# Patient Record
Sex: FEMALE | Race: WHITE | Employment: UNEMPLOYED | URBAN - METROPOLITAN AREA
[De-identification: names, ages, dates, MRNs, and addresses within clinical notes are randomized per-mention and may not be internally consistent; named-entity substitution may affect disease eponyms.]

---

## 2017-10-26 ENCOUNTER — OFFICE VISIT (OUTPATIENT)
Dept: OBGYN CLINIC | Age: 27
End: 2017-10-26
Payer: MEDICARE

## 2017-10-26 ENCOUNTER — HOSPITAL ENCOUNTER (OUTPATIENT)
Age: 27
Setting detail: SPECIMEN
Discharge: HOME | End: 2017-10-26
Payer: MEDICARE

## 2017-10-26 VITALS
WEIGHT: 158 LBS | HEART RATE: 72 BPM | RESPIRATION RATE: 18 BRPM | HEIGHT: 61 IN | SYSTOLIC BLOOD PRESSURE: 114 MMHG | DIASTOLIC BLOOD PRESSURE: 70 MMHG | BODY MASS INDEX: 29.83 KG/M2

## 2017-10-26 DIAGNOSIS — Z01.419 WELL FEMALE EXAM WITH ROUTINE GYNECOLOGICAL EXAM: Primary | ICD-10-CM

## 2017-10-26 PROCEDURE — 99395 PREV VISIT EST AGE 18-39: CPT | Performed by: ADVANCED PRACTICE MIDWIFE

## 2017-10-26 PROCEDURE — G0145 SCR C/V CYTO,THINLAYER,RESCR: HCPCS

## 2017-10-26 ASSESSMENT — PATIENT HEALTH QUESTIONNAIRE - PHQ9
1. LITTLE INTEREST OR PLEASURE IN DOING THINGS: 0
SUM OF ALL RESPONSES TO PHQ QUESTIONS 1-9: 0
SUM OF ALL RESPONSES TO PHQ9 QUESTIONS 1 & 2: 0
2. FEELING DOWN, DEPRESSED OR HOPELESS: 0

## 2017-10-26 NOTE — PATIENT INSTRUCTIONS
Breast Self-Exam: Care Instructions  Your Care Instructions  A breast self-exam is when you check your breasts for lumps or changes. This regular exam helps you learn how your breasts normally look and feel. Most breast problems or changes are not because of cancer. Breast self-exam is not a substitute for a mammogram. Having regular breast exams by your doctor and regular mammograms improve your chances of finding any problems with your breasts. Some women set a time each month to do a step-by-step breast self-exam. Other women like a less formal system. They might look at their breasts as they brush their teeth, or feel their breasts once in a while in the shower. If you notice a change in your breast, tell your doctor. Follow-up care is a key part of your treatment and safety. Be sure to make and go to all appointments, and call your doctor if you are having problems. Its also a good idea to know your test results and keep a list of the medicines you take. How do you do a breast self-exam?  · The best time to examine your breasts is usually one week after your menstrual period begins. Your breasts should not be tender then. If you do not have periods, you might do your exam on a day of the month that is easy to remember. · To examine your breasts:  ¨ Remove all your clothes above the waist and lie down. When you are lying down, your breast tissue spreads evenly over your chest wall, which makes it easier to feel all your breast tissue. ¨ Use the padsnot the fingertipsof the 3 middle fingers of your left hand to check your right breast. Move your fingers slowly in small coin-sized circles that overlap. ¨ Use three levels of pressure to feel of all your breast tissue. Use light pressure to feel the tissue close to the skin surface. Use medium pressure to feel a little deeper. Use firm pressure to feel your tissue close to your breastbone and ribs.  Use each pressure level to feel your breast tissue

## 2017-10-26 NOTE — PROGRESS NOTES
History and Physical  [address], [address] [facility] [address] [address] [facility] [phone]   Fax [phone]  [address] [place] [address]  10/26/2017              32 y.o. Chief Complaint   Patient presents with    Annual Exam       Patient's last menstrual period was 10/13/2017. Primary Care Physician: Jean Perez MD    The patient was seen and examined. She has no chief complaint today and is here for her annual exam.  Her bowels are regular. There are no voiding complaints. She denies any bloating. She denies vaginal discharge and was counseled on STD's and the need for barrier contraception.      HPI : Mojgan [facility] is a 32 y.o. female [id number]    Annual exam, no complaints trying to make healthy life style changes, less process foods more whole foods  ________________________________________________________________________  Obstetric History       T2      L2     SAB0   TAB0   Ectopic0   Molar0   Multiple0   Live Births2       # Outcome Date GA Lbr Андрей/2nd Weight Sex Delivery Anes PTL Lv   3 Term 06/11/15 40w0d  9 lb 0.5 oz (4.097 kg) F  Spinal  ITZ      Name: Loral Passy:  8                Apgar5: 9   2 Term 12 39w2d  8 lb 11 oz (3.941 kg) M Vag-Spont  N ITZ      Apgar1:  8                Apgar5: 9   1 SAB                 Past Medical History:   Diagnosis Date    Anemia complicating pregnancy 9237    Fetal demise 13    @ 11 wks     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 Review Of Systems (11 point):  Constitutional: No fever, chills or malaise; No weight change or fatigue  Head and Eyes: No vision, Headache, Dizziness or trauma in last 12 months  ENT ROS: No  Tinnitis, sinus or taste problems. + hearing loss- wears hearing aid  Hematological and Lymphatic ROS:No Lymphoma, Von Willebrand's, Hemophillia or Bleeding History  Psych ROS: No Depression, Homicidal thoughts,suicidal thoughts, or anxiety  Breast ROS: No prior breast abnormalities or lumps  Respiratory ROS: No SOB, Pneumoniae,Cough, or Pulmonary Embolism History  Cardiovascular ROS: No Chest Pain with Exertion, Palpitations, Syncope, Edema, Arrhythmia  Gastrointestinal ROS: No Indigestion, Heartburn, Nausea, vomiting, Diarrhea, Constipation,or Bowel Changes; No Bloody Stools or melena  Genito-Urinary ROS: No Dysuria, Hematuria or Nocturia. No Urinary Incontinence or Vaginal Discharge  Musculoskeletal ROS: No Arthralgia, Arthritis,Gout,Osteoporosis or Rheumatism  Neurological ROS: No CVA, Migraines, Epilepsy, Seizure Hx, or Limb Weakness  Dermatological ROS: No Rash, Itching, Hives, Mole Changes or Cancer                                                                                                                                                                                                                                  PHYSICAL Exam:     Constitutional:  Vitals:    10/26/17 1221   BP: 114/70   Site: Left Arm   Position: Sitting   Cuff Size: Medium Adult   Pulse: 72   Resp: 18   Weight: 158 lb (71.7 kg)   Height: 5' 1\" (1.549 m)       General Appearance: This  is a well Developed, well Nourished, well groomed female. Her BMI was reviewed. Nutritional decision making was discussed. Skin:  There was a Normal Inspection of the skin without rashes or lesions. There were no rashes. (Papular, Maculopapular, Hives, Pustular, Macular)     There were no lesions (Ulcers, Erythema, Abn.  Appearing Nevi) (around 10/26/2018) for Annual.   Pap smear obtained. Repeat Annual every 1 year  Cervical Cytology Evaluation begins at 24years old. If Negative Cytology, Follow-up screening per current guidelines. Mammograms every 1 year. If 37 yo and last mammogram was negative. Calcium and Vitamin D dosing reviewed. Colonoscopy screening reviewed as well as onset for bone density testing. Birth control and barrier recommendations discussed. STD counseling and prevention reviewed. Gardisil counseling completed for all patients 7-33 yo. Routine health maintenance per patients PCP. Orders Placed This Encounter   Procedures    PAP SMEAR     Patient History:    Patient's last menstrual period was 10/13/2017. OBGYN Status: Having periods  Past Surgical History:  06/11/15:  SECTION  2013: DILATION AND CURETTAGE  2016: TUBAL LIGATION  No date: WISDOM TOOTH EXTRACTION      Smoking status: Never Smoker                                                              Smokeless tobacco: Never Used                           Standing Status:   Future     Standing Expiration Date:   10/26/2018     Order Specific Question:   Collection Type     Answer: Thin Prep     Order Specific Question:   Prior Abnormal Pap Test     Answer:   No     Order Specific Question:   Screening or Diagnostic     Answer:   Screening     Order Specific Question:   HPV Requested?      Answer:   Yes -  If ASCUS Reflex HPV     Order Specific Question:   High Risk Patient     Answer:   N/A

## 2017-11-08 LAB — CYTOLOGY REPORT: NORMAL

## 2018-09-05 ENCOUNTER — OFFICE VISIT (OUTPATIENT)
Dept: FAMILY MEDICINE CLINIC | Age: 28
End: 2018-09-05
Payer: MEDICARE

## 2018-09-05 VITALS
BODY MASS INDEX: 29.64 KG/M2 | HEIGHT: 61 IN | WEIGHT: 157 LBS | OXYGEN SATURATION: 99 % | TEMPERATURE: 98.2 F | HEART RATE: 89 BPM | SYSTOLIC BLOOD PRESSURE: 144 MMHG | DIASTOLIC BLOOD PRESSURE: 80 MMHG

## 2018-09-05 DIAGNOSIS — H60.312 ACUTE DIFFUSE OTITIS EXTERNA OF LEFT EAR: Primary | ICD-10-CM

## 2018-09-05 DIAGNOSIS — H60.12 CELLULITIS OF EAR CANAL, LEFT: ICD-10-CM

## 2018-09-05 PROCEDURE — 4130F TOPICAL PREP RX AOE: CPT | Performed by: FAMILY MEDICINE

## 2018-09-05 PROCEDURE — G8427 DOCREV CUR MEDS BY ELIG CLIN: HCPCS | Performed by: FAMILY MEDICINE

## 2018-09-05 PROCEDURE — 1036F TOBACCO NON-USER: CPT | Performed by: FAMILY MEDICINE

## 2018-09-05 PROCEDURE — S0077 INJECTION, CLINDAMYCIN PHOSP: HCPCS | Performed by: FAMILY MEDICINE

## 2018-09-05 PROCEDURE — 99214 OFFICE O/P EST MOD 30 MIN: CPT | Performed by: FAMILY MEDICINE

## 2018-09-05 PROCEDURE — G8419 CALC BMI OUT NRM PARAM NOF/U: HCPCS | Performed by: FAMILY MEDICINE

## 2018-09-05 RX ORDER — CIPROFLOXACIN AND DEXAMETHASONE 3; 1 MG/ML; MG/ML
4 SUSPENSION/ DROPS AURICULAR (OTIC) 2 TIMES DAILY
Qty: 7.5 ML | Refills: 0 | Status: SHIPPED | OUTPATIENT
Start: 2018-09-05 | End: 2018-09-12

## 2018-09-05 RX ORDER — CLINDAMYCIN HYDROCHLORIDE 150 MG/1
150 CAPSULE ORAL 3 TIMES DAILY
Qty: 21 CAPSULE | Refills: 0 | Status: SHIPPED | OUTPATIENT
Start: 2018-09-05 | End: 2018-09-12

## 2018-09-05 RX ORDER — CLINDAMYCIN PHOSPHATE 150 MG/ML
600 INJECTION, SOLUTION INTRAVENOUS ONCE
Status: COMPLETED | OUTPATIENT
Start: 2018-09-05 | End: 2018-09-05

## 2018-09-05 RX ADMIN — CLINDAMYCIN PHOSPHATE 600 MG: 150 INJECTION, SOLUTION INTRAVENOUS at 10:52

## 2018-09-05 ASSESSMENT — ENCOUNTER SYMPTOMS
RESPIRATORY NEGATIVE: 1
ALLERGIC/IMMUNOLOGIC NEGATIVE: 1
EYES NEGATIVE: 1
GASTROINTESTINAL NEGATIVE: 1

## 2018-09-12 ENCOUNTER — OFFICE VISIT (OUTPATIENT)
Dept: INTERNAL MEDICINE CLINIC | Age: 28
End: 2018-09-12
Payer: MEDICARE

## 2018-09-12 VITALS
HEIGHT: 61 IN | DIASTOLIC BLOOD PRESSURE: 86 MMHG | BODY MASS INDEX: 29.83 KG/M2 | SYSTOLIC BLOOD PRESSURE: 145 MMHG | WEIGHT: 158 LBS

## 2018-09-12 DIAGNOSIS — D50.0 IRON DEFICIENCY ANEMIA DUE TO CHRONIC BLOOD LOSS: ICD-10-CM

## 2018-09-12 DIAGNOSIS — H65.00 ACUTE SEROUS OTITIS MEDIA, RECURRENCE NOT SPECIFIED, UNSPECIFIED LATERALITY: Primary | ICD-10-CM

## 2018-09-12 PROCEDURE — 1036F TOBACCO NON-USER: CPT | Performed by: INTERNAL MEDICINE

## 2018-09-12 PROCEDURE — G8427 DOCREV CUR MEDS BY ELIG CLIN: HCPCS | Performed by: INTERNAL MEDICINE

## 2018-09-12 PROCEDURE — G8419 CALC BMI OUT NRM PARAM NOF/U: HCPCS | Performed by: INTERNAL MEDICINE

## 2018-09-12 PROCEDURE — 99213 OFFICE O/P EST LOW 20 MIN: CPT | Performed by: INTERNAL MEDICINE

## 2018-09-12 ASSESSMENT — ENCOUNTER SYMPTOMS
EYE PAIN: 0
TROUBLE SWALLOWING: 0
BLOOD IN STOOL: 0
ABDOMINAL DISTENTION: 0
COLOR CHANGE: 0
WHEEZING: 0
SHORTNESS OF BREATH: 0
EYE DISCHARGE: 0
DIARRHEA: 0
COUGH: 0

## 2018-09-12 NOTE — PROGRESS NOTES
Subjective:      Patient ID: Ryland Andrade is a 29 y.o. female. Visit Information    Have you changed or started any medications since your last visit including any over-the-counter medicines, vitamins, or herbal medicines? no   Are you having any side effects from any of your medications? -  no  Have you stopped taking any of your medications? Is so, why? -  no    Have you seen any other physician or provider since your last visit? Yes   Have you had any other diagnostic tests since your last visit? No  Have you been seen in the emergency room and/or had an admission to a hospital since we last saw you? No  Have you had your routine dental cleaning in the past 6 months? no    Have you activated your [employer] account? If not, what are your barriers? No: declined      Patient Care Team:  Charity Singleton MD as PCP - General (Family Medicine)  Charity Singleton MD as PCP - S Attributed Provider  Deangelo Nguyen DO as Consulting Physician (Obstetrics & Gynecology)  Linda Ceballos MD as Obstetrician (Perinatology)    Medical History Review  Past Medical, Family, and Social History reviewed and does contribute to the patient presenting condition  Chief Complaint   Patient presents with   Cam Tyshawn Establish Care     previous pcp is Dr Jaime Goodwin     went to walk in clinic and given - finishing up the meds today     Hypertension     Her bp was elevated at the walk in clinic and here today     Health Maintenance   Topic Date Due    DTaP/Tdap/Td vaccine (1 - Tdap) 03/10/2009    Flu vaccine (1) 09/01/2018    Cervical cancer screen  10/26/2018    HIV screen  Completed     Ear pian for few days  No fever  No chils  HTN  Onset more than 2 years ago  mally mild to mod  Controlled with current po meds  Not associated with headaches or blurry vision  No chest pain          Review of Systems   Constitutional: Negative for appetite change, diaphoresis and fatigue.    HENT: Positive for ear discharge and Future     Standing Expiration Date:   12/11/2018    CBC Auto Differential     Standing Status:   Future     Standing Expiration Date:   12/11/2018     No orders of the defined types were placed in this encounter.      bp is high here   No hx of ht  Will call back to check  Rule out white coat htn

## 2018-09-14 ENCOUNTER — HOSPITAL ENCOUNTER (OUTPATIENT)
Age: 28
Discharge: HOME | End: 2018-09-14
Payer: MEDICARE

## 2018-09-14 DIAGNOSIS — D50.0 IRON DEFICIENCY ANEMIA DUE TO CHRONIC BLOOD LOSS: ICD-10-CM

## 2018-09-14 LAB
ABSOLUTE EOS #: 0.1 K/UL (ref 0–0.4)
ABSOLUTE IMMATURE GRANULOCYTE: NORMAL K/UL (ref 0–0.3)
ABSOLUTE LYMPH #: 2.2 K/UL (ref 1–4.8)
ABSOLUTE MONO #: 0.4 K/UL (ref 0.1–1.3)
ANION GAP SERPL CALCULATED.3IONS-SCNC: 13 MMOL/L (ref 9–17)
BASOPHILS # BLD: 1 % (ref 0–2)
BASOPHILS ABSOLUTE: 0 K/UL (ref 0–0.2)
BUN BLDV-MCNC: 15 MG/DL (ref 6–20)
BUN/CREAT BLD: NORMAL (ref 9–20)
CALCIUM SERPL-MCNC: 9.2 MG/DL (ref 8.6–10.4)
CHLORIDE BLD-SCNC: 101 MMOL/L (ref 98–107)
CHOLESTEROL/HDL RATIO: 3
CHOLESTEROL: 182 MG/DL
CO2: 25 MMOL/L (ref 20–31)
CREAT SERPL-MCNC: 0.67 MG/DL (ref 0.5–0.9)
DIFFERENTIAL TYPE: NORMAL
EOSINOPHILS RELATIVE PERCENT: 2 % (ref 0–4)
GFR AFRICAN AMERICAN: >60 ML/MIN
GFR NON-AFRICAN AMERICAN: >60 ML/MIN
GFR SERPL CREATININE-BSD FRML MDRD: NORMAL ML/MIN/{1.73_M2}
GFR SERPL CREATININE-BSD FRML MDRD: NORMAL ML/MIN/{1.73_M2}
GLUCOSE BLD-MCNC: 95 MG/DL (ref 70–99)
HCT VFR BLD CALC: 41.3 % (ref 36–46)
HDLC SERPL-MCNC: 61 MG/DL
HEMOGLOBIN: 13.7 G/DL (ref 12–16)
IMMATURE GRANULOCYTES: NORMAL %
LDL CHOLESTEROL: 101 MG/DL (ref 0–130)
LYMPHOCYTES # BLD: 39 % (ref 24–44)
MCH RBC QN AUTO: 27.9 PG (ref 26–34)
MCHC RBC AUTO-ENTMCNC: 33.1 G/DL (ref 31–37)
MCV RBC AUTO: 84.5 FL (ref 80–100)
MONOCYTES # BLD: 6 % (ref 1–7)
NRBC AUTOMATED: NORMAL PER 100 WBC
PDW BLD-RTO: 13.4 % (ref 11.5–14.9)
PLATELET # BLD: 232 K/UL (ref 150–450)
PLATELET ESTIMATE: NORMAL
PMV BLD AUTO: 9.2 FL (ref 6–12)
POTASSIUM SERPL-SCNC: 3.9 MMOL/L (ref 3.7–5.3)
RBC # BLD: 4.89 M/UL (ref 4–5.2)
RBC # BLD: NORMAL 10*6/UL
SEG NEUTROPHILS: 52 % (ref 36–66)
SEGMENTED NEUTROPHILS ABSOLUTE COUNT: 3 K/UL (ref 1.3–9.1)
SODIUM BLD-SCNC: 139 MMOL/L (ref 135–144)
TRIGL SERPL-MCNC: 98 MG/DL
VLDLC SERPL CALC-MCNC: NORMAL MG/DL (ref 1–30)
WBC # BLD: 5.7 K/UL (ref 3.5–11)
WBC # BLD: NORMAL 10*3/UL

## 2018-09-14 PROCEDURE — 85025 COMPLETE CBC W/AUTO DIFF WBC: CPT

## 2018-09-14 PROCEDURE — 80048 BASIC METABOLIC PNL TOTAL CA: CPT

## 2018-09-14 PROCEDURE — 80061 LIPID PANEL: CPT

## 2018-09-14 PROCEDURE — 36415 COLL VENOUS BLD VENIPUNCTURE: CPT

## 2019-01-07 ENCOUNTER — HOSPITAL ENCOUNTER (OUTPATIENT)
Age: 29
Setting detail: SPECIMEN
Discharge: HOME | End: 2019-01-07
Payer: MEDICARE

## 2019-01-07 ENCOUNTER — OFFICE VISIT (OUTPATIENT)
Dept: OBGYN CLINIC | Age: 29
End: 2019-01-07
Payer: MEDICARE

## 2019-01-07 VITALS
BODY MASS INDEX: 31.91 KG/M2 | HEART RATE: 78 BPM | HEIGHT: 61 IN | RESPIRATION RATE: 16 BRPM | SYSTOLIC BLOOD PRESSURE: 116 MMHG | WEIGHT: 169 LBS | DIASTOLIC BLOOD PRESSURE: 70 MMHG

## 2019-01-07 DIAGNOSIS — Z11.51 SPECIAL SCREENING EXAMINATION FOR HUMAN PAPILLOMAVIRUS (HPV): ICD-10-CM

## 2019-01-07 DIAGNOSIS — Z01.419 WELL FEMALE EXAM WITH ROUTINE GYNECOLOGICAL EXAM: ICD-10-CM

## 2019-01-07 DIAGNOSIS — Z01.419 WELL FEMALE EXAM WITH ROUTINE GYNECOLOGICAL EXAM: Primary | ICD-10-CM

## 2019-01-07 DIAGNOSIS — N92.1 MENORRHAGIA WITH IRREGULAR CYCLE: ICD-10-CM

## 2019-01-07 PROCEDURE — G8484 FLU IMMUNIZE NO ADMIN: HCPCS | Performed by: NURSE PRACTITIONER

## 2019-01-07 PROCEDURE — G0145 SCR C/V CYTO,THINLAYER,RESCR: HCPCS

## 2019-01-07 PROCEDURE — 99395 PREV VISIT EST AGE 18-39: CPT | Performed by: NURSE PRACTITIONER

## 2019-01-07 ASSESSMENT — PATIENT HEALTH QUESTIONNAIRE - PHQ9
SUM OF ALL RESPONSES TO PHQ QUESTIONS 1-9: 0
1. LITTLE INTEREST OR PLEASURE IN DOING THINGS: 0
SUM OF ALL RESPONSES TO PHQ QUESTIONS 1-9: 0
SUM OF ALL RESPONSES TO PHQ9 QUESTIONS 1 & 2: 0
2. FEELING DOWN, DEPRESSED OR HOPELESS: 0

## 2019-01-08 LAB — COMMENT: NORMAL

## 2019-01-16 ENCOUNTER — HOSPITAL ENCOUNTER (OUTPATIENT)
Age: 29
Discharge: HOME | End: 2019-01-16
Payer: MEDICARE

## 2019-01-16 ENCOUNTER — OFFICE VISIT (OUTPATIENT)
Dept: OBGYN CLINIC | Age: 29
End: 2019-01-16
Payer: MEDICARE

## 2019-01-16 DIAGNOSIS — N92.1 MENORRHAGIA WITH IRREGULAR CYCLE: Primary | ICD-10-CM

## 2019-01-16 DIAGNOSIS — N92.1 MENORRHAGIA WITH IRREGULAR CYCLE: ICD-10-CM

## 2019-01-16 LAB
ABSOLUTE EOS #: 0.1 K/UL (ref 0–0.4)
ABSOLUTE IMMATURE GRANULOCYTE: NORMAL K/UL (ref 0–0.3)
ABSOLUTE LYMPH #: 2.1 K/UL (ref 1–4.8)
ABSOLUTE MONO #: 0.4 K/UL (ref 0.1–1.3)
ALT SERPL-CCNC: 13 U/L (ref 5–33)
AST SERPL-CCNC: 19 U/L
BASOPHILS # BLD: 1 % (ref 0–2)
BASOPHILS ABSOLUTE: 0 K/UL (ref 0–0.2)
BUN BLDV-MCNC: 11 MG/DL (ref 6–20)
CREAT SERPL-MCNC: 0.63 MG/DL (ref 0.5–0.9)
DIFFERENTIAL TYPE: NORMAL
EOSINOPHILS RELATIVE PERCENT: 2 % (ref 0–4)
ESTIMATED AVERAGE GLUCOSE: 97 MG/DL
FOLLICLE STIMULATING HORMONE: 3.5 U/L (ref 1.7–21.5)
GFR AFRICAN AMERICAN: >60 ML/MIN
GFR NON-AFRICAN AMERICAN: >60 ML/MIN
GFR SERPL CREATININE-BSD FRML MDRD: NORMAL ML/MIN/{1.73_M2}
GFR SERPL CREATININE-BSD FRML MDRD: NORMAL ML/MIN/{1.73_M2}
GLUCOSE FASTING: 88 MG/DL (ref 70–99)
HBA1C MFR BLD: 5 % (ref 4–6)
HCG QUANTITATIVE: <1 IU/L
HCT VFR BLD CALC: 40.3 % (ref 36–46)
HEMOGLOBIN: 13.5 G/DL (ref 12–16)
IMMATURE GRANULOCYTES: NORMAL %
INR BLD: 1.1
INSULIN COMMENT: NORMAL
INSULIN REFERENCE RANGE:: NORMAL
INSULIN: 4.6 MU/L
LH: 4.1 U/L (ref 1–95.6)
LYMPHOCYTES # BLD: 34 % (ref 24–44)
MCH RBC QN AUTO: 28.3 PG (ref 26–34)
MCHC RBC AUTO-ENTMCNC: 33.5 G/DL (ref 31–37)
MCV RBC AUTO: 84.7 FL (ref 80–100)
MONOCYTES # BLD: 6 % (ref 1–7)
NRBC AUTOMATED: NORMAL PER 100 WBC
PARTIAL THROMBOPLASTIN TIME: 32.9 SEC (ref 23–31)
PDW BLD-RTO: 13.4 % (ref 11.5–14.9)
PLATELET # BLD: 232 K/UL (ref 150–450)
PLATELET ESTIMATE: NORMAL
PMV BLD AUTO: 9.1 FL (ref 6–12)
PROTHROMBIN TIME: 11.2 SEC (ref 9.7–12)
RBC # BLD: 4.75 M/UL (ref 4–5.2)
RBC # BLD: NORMAL 10*6/UL
SEG NEUTROPHILS: 57 % (ref 36–66)
SEGMENTED NEUTROPHILS ABSOLUTE COUNT: 3.6 K/UL (ref 1.3–9.1)
TSH SERPL DL<=0.05 MIU/L-ACNC: 0.61 MIU/L (ref 0.3–5)
WBC # BLD: 6.2 K/UL (ref 3.5–11)
WBC # BLD: NORMAL 10*3/UL

## 2019-01-16 PROCEDURE — 76856 US EXAM PELVIC COMPLETE: CPT | Performed by: OBSTETRICS & GYNECOLOGY

## 2019-01-16 PROCEDURE — 85025 COMPLETE CBC W/AUTO DIFF WBC: CPT

## 2019-01-16 PROCEDURE — 84520 ASSAY OF UREA NITROGEN: CPT

## 2019-01-16 PROCEDURE — 36415 COLL VENOUS BLD VENIPUNCTURE: CPT

## 2019-01-16 PROCEDURE — 85730 THROMBOPLASTIN TIME PARTIAL: CPT

## 2019-01-16 PROCEDURE — 83525 ASSAY OF INSULIN: CPT

## 2019-01-16 PROCEDURE — 84450 TRANSFERASE (AST) (SGOT): CPT

## 2019-01-16 PROCEDURE — 84460 ALANINE AMINO (ALT) (SGPT): CPT

## 2019-01-16 PROCEDURE — 82947 ASSAY GLUCOSE BLOOD QUANT: CPT

## 2019-01-16 PROCEDURE — 84443 ASSAY THYROID STIM HORMONE: CPT

## 2019-01-16 PROCEDURE — 83036 HEMOGLOBIN GLYCOSYLATED A1C: CPT

## 2019-01-16 PROCEDURE — 85610 PROTHROMBIN TIME: CPT

## 2019-01-16 PROCEDURE — 83001 ASSAY OF GONADOTROPIN (FSH): CPT

## 2019-01-16 PROCEDURE — 82565 ASSAY OF CREATININE: CPT

## 2019-01-16 PROCEDURE — 84702 CHORIONIC GONADOTROPIN TEST: CPT

## 2019-01-16 PROCEDURE — 83002 ASSAY OF GONADOTROPIN (LH): CPT

## 2019-01-16 PROCEDURE — 76830 TRANSVAGINAL US NON-OB: CPT | Performed by: OBSTETRICS & GYNECOLOGY

## 2019-01-17 ENCOUNTER — TELEPHONE (OUTPATIENT)
Dept: OBGYN CLINIC | Age: 29
End: 2019-01-17

## 2019-01-17 DIAGNOSIS — R79.1 ELEVATED PARTIAL THROMBOPLASTIN TIME (PTT): Primary | ICD-10-CM

## 2019-01-17 LAB — CYTOLOGY REPORT: NORMAL

## 2019-01-22 ENCOUNTER — TELEPHONE (OUTPATIENT)
Dept: OBGYN CLINIC | Age: 29
End: 2019-01-22

## 2019-02-04 ENCOUNTER — HOSPITAL ENCOUNTER (OUTPATIENT)
Age: 29
Discharge: HOME | End: 2019-02-04
Payer: MEDICARE

## 2019-02-04 DIAGNOSIS — R79.1 ELEVATED PARTIAL THROMBOPLASTIN TIME (PTT): ICD-10-CM

## 2019-02-04 LAB
COLLAGEN ADENOSINE-5'-DIPHOSPHATE (ADP) TIME: 119 SEC (ref 67–112)
COLLAGEN EPINEPHRINE TIME: 105 SEC (ref 85–172)
PLATELET FUNCTION INTERP: ABNORMAL

## 2019-02-04 PROCEDURE — 85240 CLOT FACTOR VIII AHG 1 STAGE: CPT

## 2019-02-04 PROCEDURE — 85576 BLOOD PLATELET AGGREGATION: CPT

## 2019-02-04 PROCEDURE — 36415 COLL VENOUS BLD VENIPUNCTURE: CPT

## 2019-02-05 LAB — FACTOR VIII ACTIVITY: 50 % (ref 50–150)

## 2019-02-06 ENCOUNTER — TELEPHONE (OUTPATIENT)
Dept: OBGYN CLINIC | Age: 29
End: 2019-02-06

## 2019-02-06 DIAGNOSIS — D69.1 ABNORMAL PLATELET FUNCTION TEST (HCC): Primary | ICD-10-CM

## 2019-03-01 ENCOUNTER — INITIAL CONSULT (OUTPATIENT)
Dept: ONCOLOGY | Age: 29
End: 2019-03-01
Payer: MEDICARE

## 2019-03-01 VITALS
TEMPERATURE: 98.3 F | HEIGHT: 61 IN | BODY MASS INDEX: 30.83 KG/M2 | WEIGHT: 163.31 LBS | HEART RATE: 82 BPM | SYSTOLIC BLOOD PRESSURE: 127 MMHG | DIASTOLIC BLOOD PRESSURE: 77 MMHG

## 2019-03-01 DIAGNOSIS — D69.1 PLATELET DYSFUNCTION (HCC): Primary | ICD-10-CM

## 2019-03-01 DIAGNOSIS — D69.9 BLEEDING TENDENCY (HCC): ICD-10-CM

## 2019-03-01 PROCEDURE — G8417 CALC BMI ABV UP PARAM F/U: HCPCS | Performed by: INTERNAL MEDICINE

## 2019-03-01 PROCEDURE — 99201 HC NEW PT, E/M LEVEL 1: CPT | Performed by: INTERNAL MEDICINE

## 2019-03-01 PROCEDURE — G8427 DOCREV CUR MEDS BY ELIG CLIN: HCPCS | Performed by: INTERNAL MEDICINE

## 2019-03-01 PROCEDURE — G8484 FLU IMMUNIZE NO ADMIN: HCPCS | Performed by: INTERNAL MEDICINE

## 2019-03-01 PROCEDURE — 99244 OFF/OP CNSLTJ NEW/EST MOD 40: CPT | Performed by: INTERNAL MEDICINE

## 2019-03-06 ENCOUNTER — HOSPITAL ENCOUNTER (OUTPATIENT)
Age: 29
Discharge: HOME | End: 2019-03-06
Payer: MEDICARE

## 2019-03-06 DIAGNOSIS — D69.1 PLATELET DYSFUNCTION (HCC): ICD-10-CM

## 2019-03-06 DIAGNOSIS — D69.9 BLEEDING TENDENCY (HCC): ICD-10-CM

## 2019-03-06 LAB
COLLAGEN ADENOSINE-5'-DIPHOSPHATE (ADP) TIME: 100 SEC (ref 67–112)
COLLAGEN EPINEPHRINE TIME: 138 SEC (ref 85–172)
INR BLD: 0.9
PARTIAL THROMBOPLASTIN TIME: 30.6 SEC (ref 24–36)
PLATELET FUNCTION INTERP: NORMAL
PROTHROMBIN TIME: 12.3 SEC (ref 11.8–14.6)

## 2019-03-06 PROCEDURE — 85245 CLOT FACTOR VIII VW RISTOCTN: CPT

## 2019-03-06 PROCEDURE — 85610 PROTHROMBIN TIME: CPT

## 2019-03-06 PROCEDURE — 85576 BLOOD PLATELET AGGREGATION: CPT

## 2019-03-06 PROCEDURE — 85300 ANTITHROMBIN III ACTIVITY: CPT

## 2019-03-06 PROCEDURE — 85730 THROMBOPLASTIN TIME PARTIAL: CPT

## 2019-03-06 PROCEDURE — 85246 CLOT FACTOR VIII VW ANTIGEN: CPT

## 2019-03-06 PROCEDURE — 85240 CLOT FACTOR VIII AHG 1 STAGE: CPT

## 2019-03-06 PROCEDURE — 36415 COLL VENOUS BLD VENIPUNCTURE: CPT

## 2019-03-08 LAB
FACTOR VIII ACTIVITY: 81 % (ref 50–150)
INR BLD: 0.9
PARTIAL THROMBOPLASTIN TIME: 25.5 SEC (ref 20.5–30.5)
PROTHROMBIN TIME: 9.8 SEC (ref 9–12)
RISTOCETIN CO-FACTOR: 74 % (ref 51–215)

## 2019-03-12 ENCOUNTER — OFFICE VISIT (OUTPATIENT)
Dept: OBGYN CLINIC | Age: 29
End: 2019-03-12
Payer: MEDICARE

## 2019-03-12 VITALS
DIASTOLIC BLOOD PRESSURE: 86 MMHG | HEART RATE: 72 BPM | HEIGHT: 61 IN | SYSTOLIC BLOOD PRESSURE: 124 MMHG | BODY MASS INDEX: 30.58 KG/M2 | WEIGHT: 162 LBS

## 2019-03-12 DIAGNOSIS — Z98.51 S/P TUBAL LIGATION: ICD-10-CM

## 2019-03-12 DIAGNOSIS — N92.6 IRREGULAR MENSES: Primary | ICD-10-CM

## 2019-03-12 DIAGNOSIS — N94.6 DYSMENORRHEA: ICD-10-CM

## 2019-03-12 PROCEDURE — G8417 CALC BMI ABV UP PARAM F/U: HCPCS | Performed by: OBSTETRICS & GYNECOLOGY

## 2019-03-12 PROCEDURE — 1036F TOBACCO NON-USER: CPT | Performed by: OBSTETRICS & GYNECOLOGY

## 2019-03-12 PROCEDURE — G8427 DOCREV CUR MEDS BY ELIG CLIN: HCPCS | Performed by: OBSTETRICS & GYNECOLOGY

## 2019-03-12 PROCEDURE — 99214 OFFICE O/P EST MOD 30 MIN: CPT | Performed by: OBSTETRICS & GYNECOLOGY

## 2019-03-12 PROCEDURE — G8484 FLU IMMUNIZE NO ADMIN: HCPCS | Performed by: OBSTETRICS & GYNECOLOGY

## 2019-03-13 LAB
SEND OUT REPORT: NORMAL
TEST NAME: NORMAL

## 2019-07-05 ENCOUNTER — HOSPITAL ENCOUNTER (OUTPATIENT)
Age: 29
Setting detail: SPECIMEN
Discharge: HOME | End: 2019-07-05
Payer: MEDICARE

## 2019-07-05 ENCOUNTER — PROCEDURE VISIT (OUTPATIENT)
Dept: OBGYN CLINIC | Age: 29
End: 2019-07-05
Payer: MEDICARE

## 2019-07-05 VITALS
HEART RATE: 72 BPM | HEIGHT: 61 IN | DIASTOLIC BLOOD PRESSURE: 84 MMHG | SYSTOLIC BLOOD PRESSURE: 124 MMHG | BODY MASS INDEX: 31.53 KG/M2 | WEIGHT: 167 LBS

## 2019-07-05 DIAGNOSIS — Z32.02 NEGATIVE PREGNANCY TEST: ICD-10-CM

## 2019-07-05 DIAGNOSIS — N92.1 MENORRHAGIA WITH IRREGULAR CYCLE: Primary | ICD-10-CM

## 2019-07-05 DIAGNOSIS — N94.6 DYSMENORRHEA: ICD-10-CM

## 2019-07-05 LAB
CONTROL: NORMAL
PREGNANCY TEST URINE, POC: NEGATIVE

## 2019-07-05 PROCEDURE — 88305 TISSUE EXAM BY PATHOLOGIST: CPT

## 2019-07-05 PROCEDURE — 58558 HYSTEROSCOPY BIOPSY: CPT | Performed by: OBSTETRICS & GYNECOLOGY

## 2019-07-05 PROCEDURE — 81025 URINE PREGNANCY TEST: CPT | Performed by: OBSTETRICS & GYNECOLOGY

## 2019-07-05 NOTE — PROGRESS NOTES
visualization of the endometrial anatomy, video was completed. During the hysteroscopic procedure an endometrial sampling was performed without incident. Pathology is pending. The patient tolerated the procedure well, the Allis was removed off the anterior cervical lip and there was noted to be adequate hemostasis. The patient was given restrictions and will follow-up in the office in 10-14 days. She will report any abdominal pain, heavy vaginal bleeding or a temperature of greater than 100.4. Hysteroscopic Findings:  Normal cavity no masses    Assessment:   Diagnosis Orders   1. Menorrhagia with irregular cycle  Specimen to Pathology Outpatient    [place] HYSTEROSCOPY,W/ENDO BX    POCT urine pregnancy   2. Dysmenorrhea  Specimen to Pathology Outpatient    [place] HYSTEROSCOPY,W/ENDO BX    POCT urine pregnancy   3. Negative pregnancy test  POCT urine pregnancy     Patient Active Problem List    Diagnosis Date Noted    Rh negative state in antepartum period 09/10/2013     Priority: High     Overview Note:     Rhogam at 28 weeks      LSIL (low grade squamous intraepithelial lesion) on Pap smear 4/2013 09/09/2013     Priority: High     Overview Note:     Repeat PAP 9/9/2013 during pregnancy intake      Uterine cramping 06/06/2015     Priority: Medium    Hearing aid worn 09/09/2013     Priority: Low    Bleeding tendency ([address] 03/01/2019    Platelet dysfunction ([address] 03/01/2019    History of miscarriage 09/01/2013           Recommendations:  Return to the office for follow-up in 2 weeks to review the pathology of the biopsy and for further recommendations. Patient is considering hysteroscopy with novasure endometrial ablation.       Jim Clarke DO

## 2019-07-09 DIAGNOSIS — R89.9 ABNORMAL LABORATORY TEST: Primary | ICD-10-CM

## 2019-07-09 LAB — SURGICAL PATHOLOGY REPORT: NORMAL

## 2019-08-19 ENCOUNTER — HOSPITAL ENCOUNTER (OUTPATIENT)
Age: 29
Discharge: HOME | End: 2019-08-19
Payer: MEDICARE

## 2019-08-19 ENCOUNTER — TELEPHONE (OUTPATIENT)
Dept: OBGYN CLINIC | Age: 29
End: 2019-08-19

## 2019-08-19 DIAGNOSIS — R89.9 ABNORMAL LABORATORY TEST: ICD-10-CM

## 2019-08-19 PROCEDURE — 36415 COLL VENOUS BLD VENIPUNCTURE: CPT

## 2019-08-20 LAB
SEND OUT REPORT: NORMAL
TEST NAME: NORMAL

## 2019-09-11 ENCOUNTER — OFFICE VISIT (OUTPATIENT)
Dept: OBGYN CLINIC | Age: 29
End: 2019-09-11
Payer: MEDICARE

## 2019-09-11 VITALS
SYSTOLIC BLOOD PRESSURE: 130 MMHG | HEART RATE: 97 BPM | WEIGHT: 170 LBS | BODY MASS INDEX: 32.1 KG/M2 | DIASTOLIC BLOOD PRESSURE: 84 MMHG | HEIGHT: 61 IN

## 2019-09-11 DIAGNOSIS — N94.6 DYSMENORRHEA: Primary | ICD-10-CM

## 2019-09-11 DIAGNOSIS — N92.1 MENORRHAGIA WITH IRREGULAR CYCLE: ICD-10-CM

## 2019-09-11 PROCEDURE — G8417 CALC BMI ABV UP PARAM F/U: HCPCS | Performed by: OBSTETRICS & GYNECOLOGY

## 2019-09-11 PROCEDURE — 1036F TOBACCO NON-USER: CPT | Performed by: OBSTETRICS & GYNECOLOGY

## 2019-09-11 PROCEDURE — 99214 OFFICE O/P EST MOD 30 MIN: CPT | Performed by: OBSTETRICS & GYNECOLOGY

## 2019-09-11 PROCEDURE — G8427 DOCREV CUR MEDS BY ELIG CLIN: HCPCS | Performed by: OBSTETRICS & GYNECOLOGY

## 2019-09-11 NOTE — PROGRESS NOTES
status:      Spouse name: Not on file    Number of children: Not on file    Years of education: Not on file    Highest education level: Not on file   Occupational History    Not on file   Social Needs    Financial resource strain: Not on file    Food insecurity:     Worry: Not on file     Inability: Not on file    Transportation needs:     Medical: Not on file     Non-medical: Not on file   Tobacco Use    Smoking status: Never Smoker    Smokeless tobacco: Never Used   Substance and Sexual Activity    Alcohol use: No    Drug use: No    Sexual activity: Yes     Partners: Male   Lifestyle    Physical activity:     Days per week: Not on file     Minutes per session: Not on file    Stress: Not on file   Relationships    Social connections:     Talks on phone: Not on file     Gets together: Not on file     Attends Denominational service: Not on file     Active member of club or organization: Not on file     Attends meetings of clubs or organizations: Not on file     Relationship status: Not on file    Intimate partner violence:     Fear of current or ex partner: Not on file     Emotionally abused: Not on file     Physically abused: Not on file     Forced sexual activity: Not on file   Other Topics Concern    Not on file   Social History Narrative    Not on file         MEDICATIONS:  No current outpatient medications on file. No current facility-administered medications for this visit. ALLERGIES:  Allergies as of 09/11/2019 - Review Complete 09/11/2019   Allergen Reaction Noted    Latex Itching 01/23/2012         REVIEW OF SYSTEMS:       A minimum of an eleven point review of systems was completed. Review Of Systems (11 point):  Constitutional: No fever, chills or malaise;  No weight change or fatigue  Head and Eyes: No vision, Headache, Dizziness or trauma in last 12 months  ENT ROS: No hearing, Tinnitis, sinus or taste problems  Hematological and Lymphatic ROS:No Lymphoma, Von  Platelet dysfunction ([facility] [facility] 03/01/2019    History of miscarriage 09/01/2013           PLAN:  Return in about 6 months (around 3/11/2020) for Annual Exam, Follow up current problem. Menstrual diary  Repeat Annual every 1 year  Cervical Cytology Evaluation begins at 24years old. If Negative Cytology, Follow-up screening per current guidelines. Counseled on preventative health maintenance follow-up. Patient was seen with total face to face time of 45 minutes. More than 50% of this visit was counseling and education regarding The primary encounter diagnosis was Dysmenorrhea. A diagnosis of Menorrhagia with irregular cycle was also pertinent to this visit. and Follow-up   as well as  counseling on preventative health maintenance follow-up.

## 2020-02-10 ENCOUNTER — OFFICE VISIT (OUTPATIENT)
Dept: OBGYN CLINIC | Age: 30
End: 2020-02-10
Payer: MEDICARE

## 2020-02-10 VITALS
HEIGHT: 61 IN | DIASTOLIC BLOOD PRESSURE: 78 MMHG | WEIGHT: 162 LBS | BODY MASS INDEX: 30.58 KG/M2 | SYSTOLIC BLOOD PRESSURE: 118 MMHG | HEART RATE: 80 BPM

## 2020-02-10 PROCEDURE — 99395 PREV VISIT EST AGE 18-39: CPT | Performed by: OBSTETRICS & GYNECOLOGY

## 2020-02-10 NOTE — PROGRESS NOTES
History and Physical  [address], [address] [address], [address] [facility] [phone]   Fax [phone]  [address] [place] [address]  2/10/2020              34 y.o. Chief Complaint   Patient presents with    Annual Exam       Patient's last menstrual period was 2020 (approximate). Primary Care Physician: Audie Mccarthy MD      HPI : Mojgan Garcia is a 34 y.o. female [id number]    States normal menses but they are getting heavier. Does not take any form of birth control because she had BTL, does not want any synthetic hormones for her heavier menses at this time. No bladder or bowel issues. ________________________________________________________________________  OB History    Para Term  AB Living   3 2 2 0 1 2   SAB TAB Ectopic Molar Multiple Live Births   1 0 0 0 0 2      # Outcome Date GA Lbr Андрей/2nd Weight Sex Delivery Anes PTL Lv   3 Term 06/11/15 40w0d  9 lb 0.5 oz (4.097 kg) F  Spinal  ITZ      Birth Comments: Education information given to mother and she verbalizes understanding about the following:  Hour for [employer]. Patient Safety Education. Infant security including the four band system and the HUGS system.   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

## 2022-09-29 PROBLEM — F32.9 MAJOR DEPRESSIVE DISORDER: Status: ACTIVE | Noted: 2022-09-29

## 2022-09-30 ENCOUNTER — HOSPITAL ENCOUNTER (OUTPATIENT)
Age: 32
Discharge: HOME | End: 2022-09-30
Payer: MEDICARE

## 2022-09-30 DIAGNOSIS — F32.9 MAJOR DEPRESSIVE DISORDER, REMISSION STATUS UNSPECIFIED, UNSPECIFIED WHETHER RECURRENT: ICD-10-CM

## 2022-09-30 LAB
ABSOLUTE EOS #: 0.1 K/UL (ref 0–0.4)
ABSOLUTE LYMPH #: 2.3 K/UL (ref 1–4.8)
ABSOLUTE MONO #: 0.4 K/UL (ref 0.1–1.3)
ANION GAP SERPL CALCULATED.3IONS-SCNC: 9 MMOL/L (ref 9–17)
BASOPHILS # BLD: 1 % (ref 0–2)
BASOPHILS ABSOLUTE: 0.1 K/UL (ref 0–0.2)
BUN BLDV-MCNC: 13 MG/DL (ref 6–20)
CALCIUM SERPL-MCNC: 9.7 MG/DL (ref 8.6–10.4)
CHLORIDE BLD-SCNC: 102 MMOL/L (ref 98–107)
CO2: 26 MMOL/L (ref 20–31)
CREAT SERPL-MCNC: 0.71 MG/DL (ref 0.5–0.9)
EOSINOPHILS RELATIVE PERCENT: 2 % (ref 0–4)
GFR AFRICAN AMERICAN: >60 ML/MIN
GFR NON-AFRICAN AMERICAN: >60 ML/MIN
GFR SERPL CREATININE-BSD FRML MDRD: ABNORMAL ML/MIN/{1.73_M2}
GLUCOSE BLD-MCNC: 100 MG/DL (ref 70–99)
HCT VFR BLD CALC: 40.1 % (ref 36–46)
HEMOGLOBIN: 13.4 G/DL (ref 12–16)
LYMPHOCYTES # BLD: 36 % (ref 24–44)
MCH RBC QN AUTO: 28.5 PG (ref 26–34)
MCHC RBC AUTO-ENTMCNC: 33.4 G/DL (ref 31–37)
MCV RBC AUTO: 85.6 FL (ref 80–100)
MONOCYTES # BLD: 7 % (ref 1–7)
PDW BLD-RTO: 13 % (ref 11.5–14.9)
PLATELET # BLD: 224 K/UL (ref 150–450)
PMV BLD AUTO: 8.7 FL (ref 6–12)
POTASSIUM SERPL-SCNC: 4.5 MMOL/L (ref 3.7–5.3)
RBC # BLD: 4.68 M/UL (ref 4–5.2)
SEG NEUTROPHILS: 54 % (ref 36–66)
SEGMENTED NEUTROPHILS ABSOLUTE COUNT: 3.5 K/UL (ref 1.3–9.1)
SODIUM BLD-SCNC: 137 MMOL/L (ref 135–144)
THYROXINE, FREE: 1.06 NG/DL (ref 0.93–1.7)
TSH SERPL DL<=0.05 MIU/L-ACNC: 0.81 UIU/ML (ref 0.3–5)
WBC # BLD: 6.4 K/UL (ref 3.5–11)

## 2022-09-30 PROCEDURE — 84443 ASSAY THYROID STIM HORMONE: CPT

## 2022-09-30 PROCEDURE — 36415 COLL VENOUS BLD VENIPUNCTURE: CPT

## 2022-09-30 PROCEDURE — 80048 BASIC METABOLIC PNL TOTAL CA: CPT

## 2022-09-30 PROCEDURE — 85025 COMPLETE CBC W/AUTO DIFF WBC: CPT

## 2022-09-30 PROCEDURE — 84439 ASSAY OF FREE THYROXINE: CPT

## 2022-10-25 ENCOUNTER — OFFICE VISIT (OUTPATIENT)
Dept: BEHAVIORAL/MENTAL HEALTH CLINIC | Age: 32
End: 2022-10-25
Payer: MEDICARE

## 2022-10-25 DIAGNOSIS — F33.1 MODERATE EPISODE OF RECURRENT MAJOR DEPRESSIVE DISORDER (HCC): Primary | ICD-10-CM

## 2022-10-25 DIAGNOSIS — F41.9 ANXIETY: ICD-10-CM

## 2022-10-25 PROCEDURE — 90791 PSYCH DIAGNOSTIC EVALUATION: CPT | Performed by: SOCIAL WORKER

## 2022-10-25 PROCEDURE — 1036F TOBACCO NON-USER: CPT | Performed by: SOCIAL WORKER

## 2022-10-25 NOTE — PROGRESS NOTES
ADULT BEHAVIORAL HEALTH ASSESSMENT  BORIS Tello  Licensed Independent        Visit Date: 10/25/2022   Time of appointment: 10:08 AM     Time spent with Patient: 50 minutes. This is patient's first appointment. Reason for Consult:  Anxiety and Depression     PCP:  Martín Gorman MD      Pt provided informed consent for the behavioral health program. Discussed with patient model of service to include the limits of confidentiality (i.e. abuse reporting, suicide intervention, etc.) and short-term intervention focused approach. Pt indicated understanding. PRESENTING PROBLEM AND HISTORY  Mary Ellen Chavez is a 28 y.o. female who presents for new evaluation and treatment of anxiety and depression. Mary Ellen Chavez presented to assessment, referred by PCP, r/t history of depression, increasing symptoms over the last several years following the birth of her second child. Mary Ellen Chavez shared she wanted to have an  when she found out she was pregnant and did not do this because of fear of her mother's beliefs about this. She expressed she feels her  would have been supportive if she was open about how she felt. She endorsed a history of feeling worried and appearing standoffish because of how her mother was growing up. She reported her mom often takes things personally and easily becomes offended and this has made it difficult for Mary Ellen Chavez to express herself. Mary Ellen Chavez shared as a result, she finds constantly putting herself down and second-guessing.      She has the following symptoms: depressed mood, anhedonia, decreased appetite, decreased sleep, excessive crying, fatigue/lack of energy, lack of motivation, excessive guilt, low self-esteem, isolating self, feelings of worthlessness, mood swings, feeling nervous, anxious, or on edge, racing worry thoughts, excessive anxiety and worry about specific stressors, inability to stop or control worry, feeling numb or detached, feeling fidgety or restless, and family conflict. Onset of symptoms was approximately many  years ago. Symptoms have been gradually worsening since about 6-7 years ago. She denies current suicidal and homicidal ideation. Family history significant for anxiety and depression. Risk factors:  miscarriage, behavioral issues with son . Current treatment includes Zoloft. She complains of the following medication side effects: dizziness. MENTAL STATUS EXAM  Mood was depressed with anxious affect. Suicidal ideation was denied. SI has improved since beginning medication. She reported she was having dark thoughts at first. Pt reported she has no intention of self-harm. Homicidal ideation was denied. Hygiene was good . Dress was appropriate. Behavior was Within Normal Limits with No observation or self-report of difficulties ambulating. Attitude was Cooperative. Eye-contact was good. Speech: rate - WNL, rhythm -  WNL, volume - WNL  Verbalizations were coherent. Thought processes were intact and goal-oriented without evidence of delusions, hallucinations, obsessions, or pradeep; with moderate cognitive distortions. Associations were characterized by intact cognitive processes. Pt was oriented to person, place, time, and general circumstances;  recent:  good. Insight and judgment were estimated to be fair, AEB, a fair  understanding of cyclical maladaptive patterns, and the ability to use insight to inform behavior change. CURRENT MEDICATIONS    Current Outpatient Medications:     sertraline (ZOLOFT) 50 MG tablet, Take 1 tablet by mouth daily, Disp: 30 tablet, Rfl: 5     FAMILY MEDICAL/MH HISTORY   Her family history includes Diabetes in her father; High Cholesterol in her father; Hypertension in her mother. PATIENT MENTAL HEALTH HISTORY  No previous services. PSYCHOSOCIAL HISTORY  Current living situation: Pt resides in her own home with  and two children (daughter 9years old, son 8years old).     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 Her children are completing online school. Support system/Intersts: Pt is in supportive relationship, she is frustrated with herself/hard on herself as she expressed wanting to be more present in her relationships with her children - she misses being at home with her children. She has 8 siblings. She has been working out. She also enjoys baking. Confucianist/Spirituality: Pt reported interested in paganism and witch craft. DRUG AND ALCOHOL CURRENT USE/HISTORY  TOBACCO:  She reports that she has never smoked. She has never used smokeless tobacco.  ALCOHOL:  She reports that she does not currently use alcohol. OTHER SUBSTANCES: She reports no history of drug use. ASSESSMENT  Jerry Nevarez presented to the appointment today for evaluation and treatment of symptoms of depression and anxiety. She is currently deemed no risk to herself or others and meets criteria for . She will benefit from continued medication evaluation to assess if medications are helpful in treating depressive symptoms. Mary Jane's depressive and anxious symptoms are not well controlled at this time. She will also benefit from brief and solution-focused consultation to address cognitive and behavioral interventions for depressive and anxious symptoms. Price Serna was in agreement with recommendations to begin psychotherapy with [facility]. Price Serna will be referred as necessary if more ongoing therapy would be beneficial.     PHQ Scores 9/29/2022 1/7/2019 10/26/2017 10/13/2016 10/1/2015   PHQ2 Score 4 0 0 0 0   PHQ9 Score 13 0 0 0 0     Interpretation of Total Score Depression Severity: 1-4 = Minimal depression, 5-9 = Mild depression, 10-14 = Moderate depression, 15-19 = Moderately severe depression, 20-27 = Severe depression    How often pt has had thoughts of death or hurting self (if PHQ positive for depression):       No flowsheet data found.   Interpretation of ENA-7 score: 5-9 = mild anxiety, 10-14 = moderate anxiety, 15+ = severe anxiety. Recommend referral to behavioral health for scores 10 or greater. DIAGNOSIS  Sharon Ashwin was seen today for anxiety and depression. Diagnoses and all orders for this visit:    Moderate episode of recurrent major depressive disorder ([facility] [address]    Anxiety      INTERVENTION  Conducted functional assessment, [place]-setting to identify pt's primary goals for [facility] visit / overall health, and Supportive techniques. Roberta Randee was scheduled for follow-up appointment to learn tools to manage anxiety, challenge negative automatic/self-defeating thoughts. INTERACTIVE COMPLEXITY  Is interactive complexity present?   No  Reason:  N/A  Additional Supporting Information:  N/A       Electronically signed by BORIS Ndiaye on 11/1/2022 at 2:27 PM

## 2022-10-27 PROBLEM — D36.7 DERMOID CYST OF NECK: Status: ACTIVE | Noted: 2022-10-27

## 2022-11-11 ENCOUNTER — OFFICE VISIT (OUTPATIENT)
Dept: BEHAVIORAL/MENTAL HEALTH CLINIC | Age: 32
End: 2022-11-11
Payer: MEDICARE

## 2022-11-11 DIAGNOSIS — F33.1 MODERATE EPISODE OF RECURRENT MAJOR DEPRESSIVE DISORDER (HCC): Primary | ICD-10-CM

## 2022-11-11 DIAGNOSIS — F41.9 ANXIETY: ICD-10-CM

## 2022-11-11 PROCEDURE — 90834 PSYTX W PT 45 MINUTES: CPT | Performed by: SOCIAL WORKER

## 2022-11-11 PROCEDURE — 1036F TOBACCO NON-USER: CPT | Performed by: SOCIAL WORKER

## 2022-12-02 ENCOUNTER — OFFICE VISIT (OUTPATIENT)
Dept: BEHAVIORAL/MENTAL HEALTH CLINIC | Age: 32
End: 2022-12-02
Payer: MEDICARE

## 2022-12-02 DIAGNOSIS — F33.1 MODERATE EPISODE OF RECURRENT MAJOR DEPRESSIVE DISORDER (HCC): Primary | ICD-10-CM

## 2022-12-02 DIAGNOSIS — F41.9 ANXIETY: ICD-10-CM

## 2022-12-02 PROCEDURE — 1036F TOBACCO NON-USER: CPT | Performed by: SOCIAL WORKER

## 2022-12-02 PROCEDURE — 90834 PSYTX W PT 45 MINUTES: CPT | Performed by: SOCIAL WORKER

## 2022-12-12 NOTE — PROGRESS NOTES
ADULT BEHAVIORAL HEALTH FOLLOW UP  BORIS Isbell  Licensed Independent          Visit Date: 12/2/2022   Time of appointment: 11:36 AM    Time spent with Patient: 40 minutes. Reason for Consult:  Depression and Anxiety     PCP:  Cherri Espinal MD      Pt provided informed consent for the behavioral health program. Discussed with patient model of service to include the limits of confidentiality (i.e. abuse reporting, suicide intervention, etc.) and short-term intervention focused approach. Pt indicated understanding. Neomi Merlin is a 28 y.o. female who presents for follow up of depression and anxiety. Celeste Haji presented to visit with report of continued increased depressive symptoms. She denied SI since her PCP took her off Zoloft and started a new medication. She identified the following triggers for feeling down; conflict with partner about intimacy, loss of vehicle, financial distress. Celeste Haji shared she had gotten behind with bills and was shocked to find out that her car was being taken due to being late on payments. She shared she owes a certain amount that she is afraid the family will not be able to pay by the deadline that the car will be sent to auction. She shared she is hoping that her partner will soon return back to work to also help with finances in the family. She shared she has still struggled with feeling she isn't contributing enough at home due to previously being at home with her children and having more energy. Celeste Haji also shared she has been feeling stressed about needing new ear implants, as she was unable to establish care with the ENT her PCP referred. Previous Recommendations: Celeste Haji was recommended to continue psychotherapy to manage depression and anxiety. Encouraged her to reach out to r/s sooner than next appointment if SI worsens or resurfaces.  Discussed plan to share with PCP that patient has been having increased SI since starting Zoloft to schedule follow-up with PCP. Patient in agreement. MENTAL STATUS EXAM  Mood was depressed with anxious affect. Suicidal ideation was denied. Homicidal ideation was denied. Hygiene was good . Dress was appropriate. Behavior was Within Normal Limits with No observation or self-report of difficulties ambulating. Attitude was Engageable. Eye-contact was good. Speech: rate - WNL, rhythm - WNL, volume - WNL. Verbalizations were goal directed. Thought processes were intact and goal-oriented without evidence of delusions, hallucinations, obsessions, or pradeep; with little cognitive distortions. Associations were characterized by intact cognitive processes. Pt was oriented to person, place, time, and general circumstances;  recent:  good. Insight and judgment were estimated to be good, AEB, a fair  understanding of cyclical maladaptive patterns, and the ability to use insight to inform behavior change. ASSESSMENT  Eric Agustin presented to the appointment today for evaluation and treatment of symptoms of depression and anxiety. She is currently deemed no risk to herself or others and meets criteria for Major Depressive Disorder and Anxiety. Tito Gonzalez discussed situational stressors that have recently exacerbated depressive symptoms; finances and relationship challenges, needing to establish with an ENT, transportation issues d/t loss of vehicle, feeling overwhelmed by maintaining demands of working and also being present at home with her family when tired. Tito Gonzalez was in agreement with recommendations to continue psychotherapy to address anxiety and depression.      PHQ Scores 9/29/2022 1/7/2019 10/26/2017 10/13/2016 10/1/2015   PHQ2 Score 4 0 0 0 0   PHQ9 Score 13 0 0 0 0     Interpretation of Total Score Depression Severity: 1-4 = Minimal depression, 5-9 = Mild depression, 10-14 = Moderate depression, 15-19 = Moderately severe depression, 20-27 = Severe depression    How often pt has had thoughts of death or hurting self (if PHQ positive for depression):       No flowsheet data found. Interpretation of ENA-7 score: 5-9 = mild anxiety, 10-14 = moderate anxiety, 15+ = severe anxiety. Recommend referral to behavioral health for scores 10 or greater. DIAGNOSIS  Mary Ellen Chavez was seen today for depression and anxiety. Diagnoses and all orders for this visit:    Moderate episode of recurrent major depressive disorder ([facility] [address]    Anxiety      INTERVENTION  Discussed various factors related to the development and maintenance of  depression and anxiety (including biological, cognitive, behavioral, and environmental factors), Trained in strategies for increasing balanced thinking, Discussed and set plan for behavioral activation, Provided education, Supportive techniques, Emphasized self-care as important for managing overall health, CBT to target depressive thoughts, cognitive distortions, and Problem-solving re: alternative ENT referrals, community resources for housing/financial assistance. PLAN  Follow-up scheduled to continue therapy, discuss patient's progress with managing depression. Encouraged patient to contact PCP if she has issues with medication. Provided some ENT providers in the area to patient due to patient stating she could not see original provider. Encouraged patient to pursue community resources for possible financial assistance. Discussed referral to SDOH worker due to financial strain, for possible help with housing resources. Patient in agreement. INTERACTIVE COMPLEXITY  Is interactive complexity present?   No  Reason:  N/A  Additional Supporting Information:  N/A       Electronically signed by Jono Aguero on 12/12/2022 at 1:13 PM

## 2022-12-21 NOTE — PROGRESS NOTES
ADULT BEHAVIORAL HEALTH FOLLOW UP  BORIS Brown  Licensed Independent          Visit Date: 11/11/2022   Time of appointment: 11:00 AM    Time spent with Patient: 40 minutes. Reason for Consult:  Depression and Anxiety     PCP:  Jose Goins MD      Pt provided informed consent for the behavioral health program. Discussed with patient model of service to include the limits of confidentiality (i.e. abuse reporting, suicide intervention, etc.) and short-term intervention focused approach. Pt indicated understanding. Jhonathan Wilson is a 28 y.o. female who presents for follow up of depression and anxiety. Daniela David presented to visit with report of increased depressive symptoms. Daniela David shared that she has noticed increase in depressive thoughts and suicidal ideation since beginning Zoloft. Daniela David denied having a plan to harm herself. She expressed various stressors have contributed to feeling more down; financial stress, challenges within relationship, feeling disconnected from children, and feeling overwhelmed by things she was dealing with at work. Daniela David expressed she had recently felt guilt when her partner wanted a friend to come over and she did not have the energy to spend time with this friend and wanted to be isolative. Daniela David admitted that trying to manage everything in the home, working, and trying to care for her children has posed to be difficult and she feels that her disconnection with her children worsened when she was no longer staying home with them. Daniela David shared she does enjoy working but is trying to develop a balance where she can feel more secure within relationship to communicate concerns and honor her contributions to the family both financially and emotionally. Previous Recommendations: Daniela David was scheduled for follow-up appointment to learn tools to manage anxiety, challenge negative automatic/self-defeating thoughts.      MENTAL STATUS EXAM  Mood was depressed with sad  and anxious affect. Suicidal ideation was reported. Denied plan of self-harm or intent to act on these feelings. [facility] notified physician who then scheduled appointment to discuss medication change. Homicidal ideation was denied. Hygiene was good . Dress was appropriate. Behavior was Within Normal Limits with No observation or self-report of difficulties ambulating. Attitude was Engageable. Eye-contact was good. Speech: rate - WNL, rhythm - WNL, volume - WNL. Verbalizations were goal directed. Thought processes were intact and goal-oriented without evidence of delusions, hallucinations, obsessions, or pradeep; with moderate cognitive distortions. Associations were characterized by intact cognitive processes. Pt was oriented to person, place, time, and general circumstances;  recent:  good. Insight and judgment were estimated to be fair, AEB, a fair  understanding of cyclical maladaptive patterns, and the ability to use insight to inform behavior change. ASSESSMENT  Helen Gomez presented to the appointment today for evaluation and treatment of symptoms of depression and anxiety. Ravi Argueta expressed she had been experiencing increased depressive symptoms and feeling overwhelmed. Due to Ravi Argueta reporting SI, [facility] informed PCP of increased depression and SI after beginning Zoloft. PCP was in agreement with seeing patient for a sooner follow-up to adjust medication. She is currently deemed low risk to herself and meets criteria for Major Depressive Disorder and Anxiety. Ravi Argueta denied plan of self-harm and was given crisis resources for support. Ravi Argueta was in agreement with recommendations to continue psychotherapy.      PHQ Scores 9/29/2022 1/7/2019 10/26/2017 10/13/2016 10/1/2015   PHQ2 Score 4 0 0 0 0   PHQ9 Score 13 0 0 0 0     Interpretation of Total Score Depression Severity: 1-4 = Minimal depression, 5-9 = Mild depression, 10-14 = Moderate depression, 15-19 = Moderately severe depression, 20-27 = Severe depression    How often pt has had thoughts of death or hurting self (if PHQ positive for depression):       No flowsheet data found. Interpretation of ENA-7 score: 5-9 = mild anxiety, 10-14 = moderate anxiety, 15+ = severe anxiety. Recommend referral to behavioral health for scores 10 or greater. DIAGNOSIS  Amparo Quan was seen today for depression and anxiety. Diagnoses and all orders for this visit:    Moderate episode of recurrent major depressive disorder [facility])    Anxiety    Crisis Resources:  National Suicide Prevention Lifeline: \"830\"  Text \"HOME\" to [address]: [phone]    INTERVENTION  Discussed various factors related to the development and maintenance of  depression and anxiety (including biological, cognitive, behavioral, and environmental factors), Trained in strategies for increasing balanced thinking, Supportive techniques, Emphasized self-care as important for managing overall health, CBT to target depression and anxiety, and Identified maladaptive thoughts. Urszula Haji was scheduled for follow-up appointment to discuss progress with managing depressive symptoms. Encouraged Amparo Quan to speak with PCP regarding SI and Zoloft. [facility] informed Amparo Quan that I would also communicate this concern to PCP for medication update/safety. Encouraged Amparo Quan to call the office for a sooner visit if symptoms worsen prior to next appointment. INTERACTIVE COMPLEXITY  Is interactive complexity present?   No  Reason:  N/A  Additional Supporting Information:  N/A       Electronically signed by Phong Evans on 12/22/2022 at 9:51 PM

## 2022-12-23 ENCOUNTER — TELEMEDICINE (OUTPATIENT)
Dept: BEHAVIORAL/MENTAL HEALTH CLINIC | Age: 32
End: 2022-12-23
Payer: MEDICARE

## 2022-12-23 DIAGNOSIS — F41.9 ANXIETY: ICD-10-CM

## 2022-12-23 DIAGNOSIS — F33.1 MODERATE EPISODE OF RECURRENT MAJOR DEPRESSIVE DISORDER (HCC): Primary | ICD-10-CM

## 2022-12-23 PROCEDURE — 90832 PSYTX W PT 30 MINUTES: CPT | Performed by: SOCIAL WORKER

## 2022-12-23 PROCEDURE — 1036F TOBACCO NON-USER: CPT | Performed by: SOCIAL WORKER

## 2022-12-23 NOTE — PROGRESS NOTES
ADULT BEHAVIORAL HEALTH FOLLOW UP  BORIS Lehman  Licensed Independent          Visit Date: 12/23/2022   Time of appointment: 11:01 AM    Time spent with Patient: 18 minutes. Patient Location: [facility]/Clinician Location: [address]; [place], [place]   This was a tele-health visit conducted via interactive/real time audio and video. Reason for Consult:  Depression and Anxiety     PCP:  Hailey Wilson MD      Pt provided informed consent for the behavioral health program. Discussed with patient model of service to include the limits of confidentiality (i.e. abuse reporting, suicide intervention, etc.) and short-term intervention focused approach. Pt indicated understanding. Pt provided verbal consent to engage in tele-health psychotherapy. This visit was completed virtually using My Chart in a private location in patient's home due to weather conditions/snow emergency (patient called to switch appointment to virtual). Ernesto Menon is a 28 y.o. female who presents for follow up of depression and anxiety. 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 She shared she decided to leave her old position to expand horizons and see what a new opportunity might bring. Since starting the new role two weeks ago, she reported she has been missing her co-workers and is unsure yet if this move was something that she wants to maintain. She expressed having some thoughts of returning to previous employment if the position is open. She reported things have been stressful because of finances overall, feeling more tired, feeling pressure at the new job to achieve certain demands and expectations, adjusting to a new schedule has been difficult due to not having as much time to spend with the kids. She is looking forward to spending time with family today due to being inside due to the weather.  She is also hoping to get to go see some of their extended family if weather permits. She was able to get scheduled for 1/9 at an ENT and was relieved about this. Previous Recommendations: Follow-up scheduled to continue therapy, discuss patient's progress with managing depression. Encouraged patient to contact PCP if she has issues with medication. Provided some ENT providers in the area to patient due to patient stating she could not see original provider. Encouraged patient to pursue community resources for possible financial assistance. Discussed referral to [facility] worker due to financial strain, for possible help with housing resources. Patient in agreement. MENTAL STATUS EXAM  Mood was depressed with anxious affect. Suicidal ideation was denied. Homicidal ideation was denied. Hygiene was good . Dress was appropriate. Behavior was Within Normal Limits with No observation or self-report of difficulties ambulating. Attitude was Engageable. Eye-contact was good. Speech: rate - WNL, rhythm - WNL, volume - WNL. Verbalizations were goal directed. Thought processes were intact and goal-oriented without evidence of delusions, hallucinations, obsessions, or pradeep; with little cognitive distortions. Associations were characterized by intact cognitive processes. Pt was oriented to person, place, time, and general circumstances;  recent:  good. Insight and judgment were estimated to be good, AEB, a fair  understanding of cyclical maladaptive patterns, and the ability to use insight to inform behavior change. ASSESSMENT  Ebb Friday presented to the appointment today for evaluation and treatment of symptoms of depression and anxiety. She is currently deemed no risk to herself or others and meets criteria for Major Depressive Disorder and Anxiety. Bhupinder Hush discussed how taking on a new role has posed to be challenging in some ways and being unsure if she will want to continue or return to previous employer.  Bhupinder Spain expressed she feels overall she wants to work on achieving balance and feeling more confident in managing demands and expectations at work amidst home life. Cee Pool was in agreement with recommendations to continue psychotherapy for treatment of depression and anxiety. PHQ Scores 9/29/2022 1/7/2019 10/26/2017 10/13/2016 10/1/2015   PHQ2 Score 4 0 0 0 0   PHQ9 Score 13 0 0 0 0     Interpretation of Total Score Depression Severity: 1-4 = Minimal depression, 5-9 = Mild depression, 10-14 = Moderate depression, 15-19 = Moderately severe depression, 20-27 = Severe depression    How often pt has had thoughts of death or hurting self (if PHQ positive for depression):       No flowsheet data found. Interpretation of ENA-7 score: 5-9 = mild anxiety, 10-14 = moderate anxiety, 15+ = severe anxiety. Recommend referral to behavioral health for scores 10 or greater. DIAGNOSIS  Cee Pool was seen today for depression and anxiety. Diagnoses and all orders for this visit:    Moderate episode of recurrent major depressive disorder ([address]    Anxiety      INTERVENTION  Discussed various factors related to the development and maintenance of  depression and anxiety (including biological, cognitive, behavioral, and environmental factors), Supportive techniques, and CBT to target anxious thoughts surrounding change and adjusting to new role at work. Giles Essex was scheduled for in person follow-up appointment to discuss progress with managing depressive & anxious symptoms. INTERACTIVE COMPLEXITY  Is interactive complexity present?   No  Reason:  N/A  Additional Supporting Information:  N/A       Electronically signed by Jarad Ramos on 12/27/2022 at 10:26 PM

## 2023-01-12 ENCOUNTER — OFFICE VISIT (OUTPATIENT)
Dept: DERMATOLOGY | Age: 33
End: 2023-01-12
Payer: MEDICARE

## 2023-01-12 VITALS
TEMPERATURE: 97.2 F | HEART RATE: 97 BPM | SYSTOLIC BLOOD PRESSURE: 144 MMHG | OXYGEN SATURATION: 96 % | BODY MASS INDEX: 24.92 KG/M2 | HEIGHT: 61 IN | DIASTOLIC BLOOD PRESSURE: 104 MMHG | WEIGHT: 132 LBS

## 2023-01-12 DIAGNOSIS — L72.0 EPIDERMOID CYST OF NECK: Primary | ICD-10-CM

## 2023-01-12 PROCEDURE — G8484 FLU IMMUNIZE NO ADMIN: HCPCS | Performed by: PHYSICIAN ASSISTANT

## 2023-01-12 PROCEDURE — 1036F TOBACCO NON-USER: CPT | Performed by: PHYSICIAN ASSISTANT

## 2023-01-12 PROCEDURE — 99202 OFFICE O/P NEW SF 15 MIN: CPT | Performed by: PHYSICIAN ASSISTANT

## 2023-01-12 PROCEDURE — G8427 DOCREV CUR MEDS BY ELIG CLIN: HCPCS | Performed by: PHYSICIAN ASSISTANT

## 2023-01-12 PROCEDURE — G8420 CALC BMI NORM PARAMETERS: HCPCS | Performed by: PHYSICIAN ASSISTANT

## 2023-01-12 NOTE — PROGRESS NOTES
Dermatology Patient Note  [address] [facility]  [address] [facility] [address] [address]  Dept: [phone]  Dept Fax: [phone]      VISITDATE: 1/12/2023   REFERRING PROVIDER: Dianne Arora MD      Ivone Wan is a 28 y.o. female  who presents today in the office for:    Cyst (Lt side of neck 1+year at least, pt said she gets pain when it is bumped or brushed against it. )      HISTORY OF PRESENT ILLNESS:  Left posterolateral neck, cystic lesion, x > 1 year. Pt states that this lesion has become pruritic occasionally. Stable in size. 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        ALLERGIES:   Allergies   Allergen Reactions    Latex Itching       SOCIAL HISTORY:  Social History     Tobacco Use    Smoking status: Never    Smokeless tobacco: Never   Substance Use Topics    Alcohol use: Not Currently       Pertinent ROS:  Review of Systems  Skin: Denies any new changing, growing or bleeding lesions or rashes except as described in the HPI   Constitutional: Denies fevers, chills, and malaise. PHYSICAL EXAM:   BP (!) 144/104   Pulse 97   Temp 97.2 °F (36.2 °C)   Ht 5' 1\" (1.549 m)   Wt 132 lb (59.9 kg)   SpO2 96%   Breastfeeding No   BMI 24.94 kg/m²     The patient is generally well appearing, well nourished, alert and conversational. Affect is normal.    Cutaneous Exam:  Physical Exam  Face and neck only was examined. Facial covering was not removed during examination. Diagnoses/exam findings/medical history pertinent to this visit are listed below:    Assessment:   Diagnosis Orders   1. Epidermoid cyst of neck             Plan:  1. Epidermoid cyst of neck  - Schedule excision      RTC Excision    Future Appointments   Date Time Provider Liliana Lozano   1/19/2023  3:00 PM BORIS Carvajal PSY [facility]         There are no Patient Instructions on file for this visit.       Electronically signed by Dylan Reeves PA-C on 1/12/23 at 3:11 PM EST

## 2023-01-19 ENCOUNTER — OFFICE VISIT (OUTPATIENT)
Age: 33
End: 2023-01-19
Payer: MEDICARE

## 2023-01-19 DIAGNOSIS — F41.9 ANXIETY: ICD-10-CM

## 2023-01-19 DIAGNOSIS — F33.1 MODERATE EPISODE OF RECURRENT MAJOR DEPRESSIVE DISORDER (HCC): Primary | ICD-10-CM

## 2023-01-19 PROCEDURE — 90832 PSYTX W PT 30 MINUTES: CPT | Performed by: SOCIAL WORKER

## 2023-01-19 PROCEDURE — 1036F TOBACCO NON-USER: CPT | Performed by: SOCIAL WORKER

## 2023-01-30 NOTE — PROGRESS NOTES
ADULT BEHAVIORAL HEALTH FOLLOW UP  BORIS Bush  Licensed Independent          Visit Date: 1/19/2023   Time of appointment: 2:45 PM    Time spent with Patient: 36 minutes. Reason for Consult:  Anxiety and Depression     PCP:  Jabari Manjarrez MD      Mita Martin is a 28 y.o. female who presents for follow up of depression and anxiety. Tito Gonzalez presented to visit with report of improved anxious and depressive symptoms. She reported she is no longer working, decided that the new position was not a good fit. She shared since she left her job, her  has also began working and they are now adjusting to the new household dynamic. She shared her parents have been helping the family get back on their Tamea Laura expressed appreciating the support but has been feeling upset when her mother puts her down for not doing things the same way she would. Tito Gonzalez shared she is hopeful for the future and has been working on connecting more with the kids since being home. Tito Gonzalez shared she was feeling good about meeting with her ENT and also planning for an upcoming medical procedure. Tito Gonzalez also discussed wanting to get her daughter into speech and physical therapy again. Previous Recommendations: Tito Gonzalez was scheduled for in person follow-up appointment to discuss progress with managing depressive & anxious symptoms. MENTAL STATUS EXAM  Mood was depressed with anxious affect. Suicidal ideation was denied. Homicidal ideation was denied. Hygiene was good . Dress was appropriate. Behavior was Within Normal Limits with No observation or self-report of difficulties ambulating. Attitude was Engageable. Eye-contact was good. Speech: rate - WNL, rhythm - WNL, volume - WNL. Verbalizations were goal directed. Thought processes were intact and goal-oriented without evidence of delusions, hallucinations, obsessions, or pradeep; with little cognitive distortions.    Associations were characterized by intact cognitive processes. Pt was oriented to person, place, time, and general circumstances;  recent:  good. Insight and judgment were estimated to be good, AEB, a fair  understanding of cyclical maladaptive patterns, and the ability to use insight to inform behavior change. ASSESSMENT  Emmanuel Plata presented to the appointment today for evaluation and treatment of symptoms of depression and anxiety. She is currently deemed no risk to herself or others and meets criteria for Major Depressive Disorder and Anxiety. Itzel Osman reported improvement in depression and anxiety, has been focusing more on addressing her own health needs and wanting to rebuild connection with her children as she has more time in the home. Itzel Osman was in agreement with recommendations to continue psychotherapy for treatment of depression and anxiety. [facility] discussed plan to transfer care after [facility]'s last day on 3/23, will provide Itzel Osman with referrals for ongoing therapy. Itzel Osman was in agreement. PHQ Scores 9/29/2022 1/7/2019 10/26/2017 10/13/2016 10/1/2015   PHQ2 Score 4 0 0 0 0   PHQ9 Score 13 0 0 0 0     Interpretation of Total Score Depression Severity: 1-4 = Minimal depression, 5-9 = Mild depression, 10-14 = Moderate depression, 15-19 = Moderately severe depression, 20-27 = Severe depression    How often pt has had thoughts of death or hurting self (if PHQ positive for depression):       No flowsheet data found. Interpretation of ENA-7 score: 5-9 = mild anxiety, 10-14 = moderate anxiety, 15+ = severe anxiety. Recommend referral to behavioral health for scores 10 or greater. DIAGNOSIS  Itzel Osman was seen today for anxiety and depression.     Diagnoses and all orders for this visit:    Moderate episode of recurrent major depressive disorder [facility])    Anxiety      INTERVENTION  Discussed and set plan for behavioral activation, Trained in improving communication skills, Emphasized self-care as important for managing overall health, and CBT to target depressive symptoms. Malika Antwon was scheduled for follow-up appointment to check-in with patient on progress managing depressive and anxious symptoms. INTERACTIVE COMPLEXITY  Is interactive complexity present?   No  Reason:  N/A  Additional Supporting Information:  N/A       Electronically signed by BORIS Pickard on 1/30/2023 at 2:33 PM

## 2023-02-09 ENCOUNTER — OFFICE VISIT (OUTPATIENT)
Age: 33
End: 2023-02-09
Payer: COMMERCIAL

## 2023-02-09 DIAGNOSIS — F41.9 ANXIETY: ICD-10-CM

## 2023-02-09 DIAGNOSIS — F33.1 MODERATE EPISODE OF RECURRENT MAJOR DEPRESSIVE DISORDER (HCC): Primary | ICD-10-CM

## 2023-02-09 PROCEDURE — 90834 PSYTX W PT 45 MINUTES: CPT | Performed by: SOCIAL WORKER

## 2023-02-09 NOTE — PROGRESS NOTES
ADULT BEHAVIORAL HEALTH FOLLOW UP  BORIS Drake  Licensed Independent          Visit Date: 2/9/2023   Time of appointment: 3:00 PM    Time spent with Patient: 40 minutes. Reason for Consult:  Anxiety and Depression     PCP:  Gigi Wong MD      Adeline Lee is a 28 y.o. female who presents for follow up of depression and anxiety. She reported symptoms have been stable, however she has noticed anxiety/stress present. She has been feeling overwhelmed by trying to work on establishing services for her daughter together to gather more support. She shared her relationship with her mother has improved, she attributed this to because her mother has been respecting more of her doing things her way. She expressed she has been grateful to be home with the kids now that she has transitioned to being home full-time, she feels she has begun to connect more with her children and reported she enjoys spending time with them. She identified it would be helpful to have more support with taking care of the home from her partner. Scooter Taylor reported she feels she gets too upset over this stuff at times. She is hopeful that the family will soon be approved and move to a townhouse to gain more space and is looking forward to making this space their own. She has been spending a lot of her time managing household chores, spending time with children, and watching tv. Previous Recommendations: Scooter Taylor was scheduled for follow-up appointment to check-in with patient on progress managing depressive and anxious symptoms. MENTAL STATUS EXAM  Mood was within normal limits with anxious affect. Suicidal ideation was denied. Homicidal ideation was denied. Hygiene was good . Dress was appropriate. Behavior was Within Normal Limits with No observation or self-report of difficulties ambulating. Attitude was Engageable. Eye-contact was good.   Speech: rate - WNL, rhythm - WNL, volume - WNL.  Verbalizations were goal directed. Thought processes were intact and goal-oriented without evidence of delusions, hallucinations, obsessions, or pradeep; with little cognitive distortions. Associations were characterized by intact cognitive processes. Pt was oriented to person, place, time, and general circumstances;  recent:  good. Insight and judgment were estimated to be good, AEB, a fair  understanding of cyclical maladaptive patterns, and the ability to use insight to inform behavior change. ASSESSMENT  Emmanuel Plata presented to the appointment today for evaluation and treatment of symptoms of depression and anxiety. She is currently deemed no risk to herself or others and meets criteria for Major Depressive Disorder and Anxiety. Itzelaubrie Osman expressed she has been working on navigating her transition to being home full-time, as she was previously working. She identified improvement in self-care regimens for herself - addressing health/procedures for herself and working on gathering more support for her daughter's health. Itzelaubrie Osman shared she has noticed some challenges that are related to expectations and managing feelings. She was recommended to continue psychotherapy with a clinician whom she can meet with for regularly scheduled appointments. Itzel Osman was in agreement. PHQ Scores 9/29/2022 1/7/2019 10/26/2017 10/13/2016 10/1/2015   PHQ2 Score 4 0 0 0 0   PHQ9 Score 13 0 0 0 0     Interpretation of Total Score Depression Severity: 1-4 = Minimal depression, 5-9 = Mild depression, 10-14 = Moderate depression, 15-19 = Moderately severe depression, 20-27 = Severe depression    How often pt has had thoughts of death or hurting self (if PHQ positive for depression):       No flowsheet data found. Interpretation of ENA-7 score: 5-9 = mild anxiety, 10-14 = moderate anxiety, 15+ = severe anxiety. Recommend referral to behavioral health for scores 10 or greater.       DIAGNOSIS  Itzel Osman was seen today for anxiety and depression. Diagnoses and all orders for this visit:    Moderate episode of recurrent major depressive disorder ([facility] [address]    Anxiety      INTERVENTION  Trained in strategies for increasing balanced thinking, Trained in improving communication skills, Discussed self-care (sleep, nutrition, rewarding activities, social support, exercise), Emphasized self-care as important for managing overall health, CBT to target cycle of anxious thoughts & depressive/self-defeating thoughts, Collaborative treatment planning,Clarified role of [facility] in primary care,Recommended that pt establish with a mental health clinician with whom they can meet regularly for psychotherapy services, and Reviewed options for identifying appropriate providers. PLAN  Follow-up appointment scheduled in March to discuss transition of care. Referrals will be sent via My Chart for ongoing therapy. Patient stated she would prefer to be referred to clinicians local to [place] area. INTERACTIVE COMPLEXITY  Is interactive complexity present?   No  Reason:  N/A  Additional Supporting Information:  N/A       Electronically signed by Sherlon Crigler, LISW on 2/21/2023 at 1:44 PM

## 2023-02-22 ENCOUNTER — PROCEDURE VISIT (OUTPATIENT)
Dept: DERMATOLOGY | Age: 33
End: 2023-02-22

## 2023-02-22 VITALS
SYSTOLIC BLOOD PRESSURE: 141 MMHG | OXYGEN SATURATION: 99 % | TEMPERATURE: 97.8 F | HEART RATE: 75 BPM | DIASTOLIC BLOOD PRESSURE: 97 MMHG | WEIGHT: 132 LBS | BODY MASS INDEX: 24.92 KG/M2 | HEIGHT: 61 IN

## 2023-02-22 DIAGNOSIS — L72.3 INFLAMED EPIDERMOID CYST OF SKIN: Primary | ICD-10-CM

## 2023-02-22 RX ORDER — LIDOCAINE HYDROCHLORIDE AND EPINEPHRINE 10; 10 MG/ML; UG/ML
20 INJECTION, SOLUTION INFILTRATION; PERINEURAL ONCE
Status: SHIPPED | OUTPATIENT
Start: 2023-02-22

## 2023-02-22 NOTE — PROGRESS NOTES
Dermatology Procedure Note   [address] [facility] DERMATOLOGY  [id number] [id number]  Dept: [phone]  Dept Fax: [phone]      Procedure Date: 2/22/2023  Procedure Time: 9:00 AM    Procedure Practitioner: Daniel Gosselin, PA-C    Procedure: Excision    Pre-Procedure Diagnosis: Cyst    Post-Procedure Diagnosis: Same as Pre-Procedure Diagnosis    Informed Consent: The procedure and its risks were explained including but not limited to pain, bleeding, infection, permanent scar, permanent pigment alteration and recurrence. Consent to proceed with the procedure was obtained from the patient or the parent by the practitioner    Time Out:  A time out was conducted immediately before starting the procedure that confirmed a final verification of the correct patient, correct procedure, and correct site. Procedure Details:  Excision and layered closure: The 14 mm lesion on the Left neck was cleaned with chlorhexidine and anesthetized with 1% lidocaine with epinephrine. The lesion was then dissected out from an elliptical incision down to subcutaneous fat with a 1 mm margin for a total excised diameter of 16. Hemostasis was then achieved spontaneously. Due to tension on the defect, undermining was performed to allow for closure. The subcutaneous tissues were then brought together with 4-0 Vicryl. The epidermis was approximated with 5-0Ethilon. running sutures. Final layered closure was 2.8 cm. Vaseline and a bulky wound dressing was applied.      Procedure Performed By: Daniel Gosselin, PA-C    Estimated Blood Loss: Minimal    Pathologic Specimen: H&E    Procedure Tolerance: Good    Complication(s): None    Electronically signed by Daniel Gosselin, PA-C on 2/22/23 at 9:00 AM EST

## 2023-02-22 NOTE — PROGRESS NOTES
Dermatology Surgery Pre-Visit Checklist    (Please complete with  Y (yes) / N (no) or explain)    Pathologic Diagnosis: cyst of the neck    Photo available of surgery site in media: NA    Competent to give informed consent?  yes   If not, who is authorized to do so: NA    Need for help for wound care: no   If so, who will do so: NA    Are you diagnosed:   Diabetes: no   If yes, last A1C: NA       HIV: no       Hepatitis: no       Current smoker: no  If yes, how much: NA    So you have any of the following: Pacemaker: no       Defibrillator: no       Artificial Heart valve: no                Artificial Joints: no       Other Implantable Device: no       Organ Transplant: no       Other: NA    Are you on blood thinners such as: Asprin: no       Warfarin/Coumadin: no       Other: no    Any allergies to the following:  Lidocaine: no       Iodine: no       Adhesive: no       Other: NA

## 2023-02-22 NOTE — PATIENT INSTRUCTIONS
Post-operative Instructions for Excision Surgery  1. Wash hands with antibacterial soap before beginning wound care. Remove the pressure bandage the morning after surgery. Apply Vaseline ointment over the counter with a Q-tip and/or gauze; cover with a clean band-aid 2-3 times daily UNTIL SUTURES OR STAPLES ARE REMOVED. The surgical area can get wet, unless otherwise directed. We recommend that you avoid direct water pressure on the surgical site during the healing process. Important: Keep the area moist with the ointment and covered at all times. Keeping the surgical site moist and covered will help to prevent scab formation. .  3. For 2 days following surgery: No smoking, no ingestion of alcohol, no aspirin (unless prescribed as part of a cardiac or stroke regimen)  or NSAIDS (no Advil, Motrin, Aleve, Ibuprofen). Take only Tylenol (acetaminophen) for discomfort. Any blood-thinner or anticoagulation medication you regularly take should be continued at your prescribing doctors discretion. 4.  For facial and scalp surgery: Do not bend over for 2 days and use 2 pillows to sleep for 2 nights. 5. Please note: Swelling and/or bruising may be visible below the surgical site due to gravitys pull. This may be more evident after facial procedures. Some very mild drainage onto the band-aid may be a normal part of wound healing a few days after surgery. 6. Do not do any heavy lifting or workout exercises UNTIL SUTURES OR STAPLES ARE REMOVED. No sit-ups, jogging, running, free-weight lifting, swimming, racquet ball, tennis, aerobics, golf, bowling, or speed-walking. You may walk at a normal pace. You may resume these activities after your suture/staple removal appointment. 7.  If the procedure is in an area where you shave, do not shave within 2 inches of the surgical site. 8. JOSHUA/Compression stocking instructions: If a JOSHUA/Compression stocking is applied, leave it in place until the morning. Following the dressing change, reapply the stocking and leave it on during the day. Remove the stocking at bedtime. Continue to wear the stocking until the sutures are removed. 9. During your surgery, your surgeon may have used an underlying layer of sutures, which are normally absorbed by the body. In some instances, the suture material may not be absorbed and you may need to come back for a follow-up visit. If you notice an acne-like bump or pimple forming along or in the suture line, as late as 2-8 weeks after your surgery, it may be the absorbable suture not being absorbed, and we would like you to call the office and make an appointment. An additional result of the procedure may be a slight raising or puckering at the edges of the surgical area due to overzealous healing at the surgery site. This may occur several weeks after the sutures have been removed. If you notice this, please call our office and make an appointment.

## 2023-02-23 ENCOUNTER — NURSE ONLY (OUTPATIENT)
Dept: LAB | Age: 33
End: 2023-02-23

## 2023-02-27 NOTE — RESULT ENCOUNTER NOTE
We have received and reviewed your biopsy results, which demonstrated a benign skin lesion called an epidermoid cyst.  No further Tx is required for this lesion.

## 2023-03-01 ENCOUNTER — NURSE ONLY (OUTPATIENT)
Dept: DERMATOLOGY | Age: 33
End: 2023-03-01

## 2023-03-01 VITALS
HEIGHT: 61 IN | WEIGHT: 132 LBS | OXYGEN SATURATION: 96 % | TEMPERATURE: 98.1 F | BODY MASS INDEX: 24.92 KG/M2 | HEART RATE: 97 BPM

## 2023-03-01 DIAGNOSIS — Z48.02 VISIT FOR SUTURE REMOVAL: Primary | ICD-10-CM

## 2023-03-09 ENCOUNTER — OFFICE VISIT (OUTPATIENT)
Age: 33
End: 2023-03-09

## 2023-03-09 DIAGNOSIS — F41.9 ANXIETY: ICD-10-CM

## 2023-03-09 DIAGNOSIS — F33.1 MODERATE EPISODE OF RECURRENT MAJOR DEPRESSIVE DISORDER (HCC): Primary | ICD-10-CM

## 2023-03-09 PROCEDURE — 90834 PSYTX W PT 45 MINUTES: CPT | Performed by: SOCIAL WORKER

## 2023-03-09 NOTE — PROGRESS NOTES
ADULT BEHAVIORAL HEALTH FOLLOW UP  Tienjonna Alexandra ENEZULEYKA  Licensed Independent          Visit Date: 3/9/2023   Time of appointment: 3:05 PM    Time spent with Patient: 45 minutes. Reason for Consult:  Anxiety and Depression     PCP:  Anjel Vidales MD      Yvette Clement is a 35 y.o. female who presents for follow up of depression and anxiety. Emelia Lopez presented to visit with self-report of worsening depressive symptoms, occurring over the last two weeks. Emelia Lopez reported her mood has been \"darker\" lately, she has days where she has been up until 1 AM and other days where she wants to sleep the entire day, increased irritability, feeling on edge, decreased distress tolerance, up and down appetite, racing thoughts, difficulty with attention/focus. Emelia Lopez reported she has been having fleeting suicidal ideation and/or thoughts of self-harm. She was unable to identify a specific trigger for these feelings, expressing she feels thoughts are completely random. Emelia John reported sometimes these thoughts pass quickly and other times they linger. She reported noticing she does begin to have these thoughts while cooking, and she has to stop herself from acting on these thoughts. Emelia John reported she does not intentionally want to act on these feelings, however recognizes that she also does not care if she would happen to burn herself while cooking. Emelia Cai reported when considering self-harm, she has had thoughts of cutting herself, burning herself, and stated she has no specific plan to take her own life. She also denied any thoughts of harming her children or her , and denied ever having an identified plan to harm her children or  in the past. Emelia Lopez reported the family does not have fire arms. Emelia Lopez states she has never been hospitalized for a mental health emergency.  When further discussing Mary Jane's increase in depressive symptoms, [address]  and Emelia Lopez identified safety plan; Emelia Lopez reported safety plan would be for her mom to watch the children, if she felt she could not keep herself safe. She also identified her  as another support person. Williams Whitlock reported she has been trying to utilize the following coping tools when feeling overwhelmed; meditate, listen to music, shower, crocheting, and watching tv instead of playing video games late at night. 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 Williams Whitlock also identified recent ER visits for the children due to unknown bug bites was contributing to her stress. Pt shared she had been feeling better that she went through procedure for removal of her neck cyst and was relieved when finding out this was benign, as she expressed this helped her have reassurance and feel less anxious. Previous Recommendations: Follow-up appointment scheduled in March to discuss transition of care. Referrals will be sent via My Chart for ongoing therapy. Patient stated she would prefer to be referred to clinicians local to [facility]. MENTAL STATUS EXAM  Mood was within normal limits with anxious affect. Suicidal ideation was reported, she endorsed experiencing fleeting thoughts of self-harming behavior. She denied intention of acting on these thoughts, however she stated if she was harmed unintentionally, she would be okay with this. Pt reported recent example within the last week -  while cooking dinner, kids were watching tv, she began thinking of these thoughts while cutting up food, and when she placed food in the skillet, she got too close to the pan - which had oil in it and splattered on her. She stated this was not intentional, but felt okay it happened. She denied present thoughts of self-harm at session. Protective factors; Pt wants to seek help to address depression, Pt has communicated with her  about feeling this way in past, Pt identified bonding/connection has improved with kids, Pt expresses love for her children and desire to keep them safe. Pt has identified support person; her mother to assist with children if needed. Homicidal ideation was denied. Hygiene was good . Dress was appropriate. Behavior was Within Normal Limits with No observation or self-report of difficulties ambulating. Attitude was Engageable. Eye-contact was good. Speech: rate - WNL, rhythm - WNL, volume - WNL. Verbalizations were goal directed. Thought processes were intact and goal-oriented without evidence of delusions, hallucinations, obsessions, or pradeep; with moderate cognitive distortions. Associations were characterized by intact cognitive processes. Pt was oriented to person, place, time, and general circumstances;  recent:  good. Insight and judgment were estimated to be good, AEB, a fair  understanding of cyclical maladaptive patterns, and the ability to use insight to inform behavior change. Efren Cleary presented to the appointment today for evaluation and treatment of symptoms of depression and anxiety. She is currently deemed low to moderate risk to herself or others and meets criteria for Major Depressive Disorder and Anxiety. Marline Hodges endorsed worsening depressive symptoms with suicidal thoughts of self-harm, over the last two weeks. Marline Hodges denied current intent or plan to act on these feelings. She also denied thoughts, feelings, or plans to harm others. Marline Hodges and [facility] reviewed local crisis resources in the area and [facility] recommended patient seek ER if SI was present. Marline Hodges and [facility] also discussed safety plan to protect herself and the children due to fleeting SI.  Marline Hodges was recommended to follow-up with her PCP, ASAP to discuss plan of care for medication management and alert him of change in depression. Terrell Butler and [facility] also discussed psychiatry as an available service and offered to provide Terrell Butler with a list of providers. At this time, Terrell Butler wanted to first follow-up with her PCP about medication and [facility] supported patient in this. Terrell Butler stated she would call to schedule an appointment with PCP. Terrell Butler was in agreement with recommendations. PHQ Scores 9/29/2022 1/7/2019 10/26/2017 10/13/2016 10/1/2015   PHQ2 Score 4 0 0 0 0   PHQ9 Score 13 0 0 0 0     Interpretation of Total Score Depression Severity: 1-4 = Minimal depression, 5-9 = Mild depression, 10-14 = Moderate depression, 15-19 = Moderately severe depression, 20-27 = Severe depression    How often pt has had thoughts of death or hurting self (if PHQ positive for depression):       No flowsheet data found. Interpretation of ENA-7 score: 5-9 = mild anxiety, 10-14 = moderate anxiety, 15+ = severe anxiety. Recommend referral to behavioral health for scores 10 or greater. DIAGNOSIS  Terrell Butler was seen today for anxiety and depression. Diagnoses and all orders for this visit:    Moderate episode of recurrent major depressive disorder ([address]    Anxiety      INTERVENTION  Discussed various factors related to the development and maintenance of  depression, suicidal thoughts/threats, anxiety, and stress (including biological, cognitive, behavioral, and environmental factors), Discussed benefits of referral for specialty care, CBT to target depressive thoughts (worsening), challenging negative automatic thoughts of self-harm, Identified maladaptive thoughts, Reviewed options for identifying appropriate providers, and Safety planning re: Pt agreed that she would call her mother and notify her  in the event that she felt she could not keep herself safe, and she would ensure her children were in her mother or 's care.  Pt  was also in agreement with all of the crisis resources listed below as possible supports, if she is experiencing suicidal ideation and/or thoughts of self-harm. Ino Navarro denies any thoughts or plans to harm others at time of visit . Crisis Resources: (also given to patient in person, on print-out and business card)    [address] ([address] [facility]) [phone] - open 365 days/year 24/7 - offers community based assessments in the home    [address] also offers a same day walk-in clinic for psychiatry - medication evaluation/management   Suicide Prevention Lifeline: \"435\" - text or call  Text \"HOME\" to [facility] [address] for [facility] Emergency (You can also call [facility] or go to your nearest hospital and they will transport to facility):     [facility] [address], [address] [place] [address], [address] Critical access hospital [phone]  [address] [phone]    Henrietta Cobos was scheduled for follow-up appointment with [facility] on March 29th @ 3 PM. [facility] strongly advised patient calls PCP to schedule follow-up as soon as possible, due to worsening depression, to discuss his preferred plan of care. [facility] offered to provide Ino Navarro with psychiatry referrals due to worsening depression and discussed available crisis resources in the community at length. Ino Navarro agreed to contact PCP for further guidance. Patient was advised to call our office LINDSAY ANDRES [facility] [phone] or [facility] [phone] if symptoms worsen prior to next appointment. INTERACTIVE COMPLEXITY  Is interactive complexity present?   No  Reason:  N/A  Additional Supporting Information:  N/A       Electronically signed by BORIS Marino on 3/14/2023 at 12:29 PM

## 2023-05-04 ENCOUNTER — OFFICE VISIT (OUTPATIENT)
Dept: BEHAVIORAL/MENTAL HEALTH CLINIC | Age: 33
End: 2023-05-04
Payer: MEDICAID

## 2023-05-04 DIAGNOSIS — F41.9 ANXIETY: ICD-10-CM

## 2023-05-04 DIAGNOSIS — F33.1 MODERATE EPISODE OF RECURRENT MAJOR DEPRESSIVE DISORDER (HCC): Primary | ICD-10-CM

## 2023-05-04 PROCEDURE — 90832 PSYTX W PT 30 MINUTES: CPT | Performed by: SOCIAL WORKER

## 2023-05-09 NOTE — PROGRESS NOTES
ADULT BEHAVIORAL HEALTH FOLLOW UP  BORIS Strickland  Licensed Independent          Visit Date: 5/4/2023   Time of appointment: 8:14 AM    Time spent with Patient: 37 minutes. Reason for Consult:  Anxiety and Depression       PCP:  Tyshawn Art MD      Renuka Rosado is a 35 y.o. female who presents for follow up of depression and anxiety. Silvestre Streeter reported she has been feeling better since last session, she reported decrease in frequency/severity of depressive symptoms, decrease in thoughts of self-harm arising and denied self-harm behavior at time of assessment. Silvestre Streeter shared she has been feeling anxious/on edge due to a co-worker's behavior being unpredictable and erratic. She reported this employee has not been at work, however, they have been frequently calling the hotel and she was concerned that choosing to vacuum the floor that day may have contributed to setting him off. Silvestre Streeter shared she has been feeling more hopeful for the future despite this because her  has also gained employment part-time, and this has helped take some of the financial burden off. Silvestre Streeter shared the family is hoping to get a town-home and she is grateful to have more of a schedule/routine with the kids now that both parents are working consistently. Silvestre Streeter reported she also attended her follow-up with PCP to review her medication and felt this was helpful. Previous Recommendations: Silvestre Streeter was recommended to schedule follow-up appointment, she is awaiting to find out her new work schedule to schedule next appointment. Silvestre Streeter can call the office or send [facility] My Chart message to schedule. MENTAL STATUS EXAM  Mood was within normal limits with anxious affect. Suicidal ideation was denied. Homicidal ideation was denied. Hygiene was good . Dress was appropriate. Behavior was Within Normal Limits with No observation or self-report of difficulties ambulating.    Attitude was

## 2023-05-14 ENCOUNTER — HOSPITAL ENCOUNTER (EMERGENCY)
Age: 33
Discharge: HOME | End: 2023-05-14
Attending: EMERGENCY MEDICINE
Payer: MEDICAID

## 2023-05-14 ENCOUNTER — APPOINTMENT (OUTPATIENT)
Dept: GENERAL RADIOLOGY | Age: 33
End: 2023-05-14
Payer: MEDICAID

## 2023-05-14 VITALS
OXYGEN SATURATION: 100 % | SYSTOLIC BLOOD PRESSURE: 140 MMHG | DIASTOLIC BLOOD PRESSURE: 92 MMHG | HEART RATE: 74 BPM | HEIGHT: 61 IN | TEMPERATURE: 98.3 F | WEIGHT: 139 LBS | RESPIRATION RATE: 16 BRPM | BODY MASS INDEX: 26.24 KG/M2

## 2023-05-14 DIAGNOSIS — S60.222A CONTUSION OF LEFT HAND, INITIAL ENCOUNTER: Primary | ICD-10-CM

## 2023-05-14 PROCEDURE — 99283 EMERGENCY DEPT VISIT LOW MDM: CPT

## 2023-05-14 PROCEDURE — 6370000000 HC RX 637 (ALT 250 FOR IP): Performed by: EMERGENCY MEDICINE

## 2023-05-14 PROCEDURE — 73130 X-RAY EXAM OF HAND: CPT

## 2023-05-14 RX ORDER — ACETAMINOPHEN 500 MG
1000 TABLET ORAL ONCE
Status: COMPLETED | OUTPATIENT
Start: 2023-05-14 | End: 2023-05-14

## 2023-05-14 RX ADMIN — ACETAMINOPHEN 1000 MG: 500 TABLET ORAL at 12:08

## 2023-05-14 ASSESSMENT — ENCOUNTER SYMPTOMS
EYE PAIN: 0
BACK PAIN: 0
ABDOMINAL PAIN: 0
COLOR CHANGE: 0
SHORTNESS OF BREATH: 0

## 2023-05-14 ASSESSMENT — PAIN DESCRIPTION - LOCATION: LOCATION: HAND

## 2023-05-14 ASSESSMENT — PAIN - FUNCTIONAL ASSESSMENT: PAIN_FUNCTIONAL_ASSESSMENT: 0-10

## 2023-05-14 ASSESSMENT — PAIN DESCRIPTION - ORIENTATION: ORIENTATION: LEFT

## 2023-05-14 ASSESSMENT — PAIN SCALES - GENERAL: PAINLEVEL_OUTOF10: 8

## 2023-05-14 ASSESSMENT — LIFESTYLE VARIABLES
HOW OFTEN DO YOU HAVE A DRINK CONTAINING ALCOHOL: MONTHLY OR LESS
HOW MANY STANDARD DRINKS CONTAINING ALCOHOL DO YOU HAVE ON A TYPICAL DAY: 3 OR 4

## 2023-06-20 ENCOUNTER — OFFICE VISIT (OUTPATIENT)
Dept: BEHAVIORAL/MENTAL HEALTH CLINIC | Age: 33
End: 2023-06-20
Payer: MEDICAID

## 2023-06-20 DIAGNOSIS — F33.1 MODERATE EPISODE OF RECURRENT MAJOR DEPRESSIVE DISORDER (HCC): Primary | ICD-10-CM

## 2023-06-20 DIAGNOSIS — F41.9 ANXIETY: ICD-10-CM

## 2023-06-20 PROCEDURE — 90834 PSYTX W PT 45 MINUTES: CPT | Performed by: SOCIAL WORKER

## 2023-06-20 NOTE — PROGRESS NOTES
major depressive disorder ([facility] [address]    Anxiety        INTERVENTION  Discussed various factors related to the development and maintenance of  depression and anxiety (including biological, cognitive, behavioral, and environmental factors), Trained in strategies for increasing balanced thinking, Discussed and set plan for behavioral activation, Discussed benefits of referral for specialty care, Provided Psychoeducation re: coping skills for anxiety/depression, feeling identification, and tracking to build self-awareness on warning signs for emotional burnout, importance of asking for help before stress worsens at work, CBT to target negative automatic thoughts & self-defeating thoughts, and Problem-solving re: discussed Perry Dietrich developing a plan with specific guidelines on when/how she will ask for help to ensure she has adequate support and avoiding burnout through various coping skills . PLAN  1) Follow-up appointment scheduled on 7/13 @ 1 PM to discuss progress with managing stress, depression, and anxiety. 2) Discussed goal of identifying/quantifying number of rooms that Perry Dietrich will then know when she should ask for help to honor herself, and avoid feeling burnt out at work, this will likely help Perry Dietrich build self-awareness of when she feels overwhelmed and practicing outreach for support from co-workers. INTERACTIVE COMPLEXITY  Is interactive complexity present?   No  Reason:  N/A  Additional Supporting Information:  N/A       Electronically signed by Keith Sullivan on 6/20/2023 at 8:11 PM

## 2023-07-13 ENCOUNTER — OFFICE VISIT (OUTPATIENT)
Dept: BEHAVIORAL/MENTAL HEALTH CLINIC | Age: 33
End: 2023-07-13
Payer: MEDICAID

## 2023-07-13 DIAGNOSIS — F41.9 ANXIETY: ICD-10-CM

## 2023-07-13 DIAGNOSIS — F33.1 MODERATE EPISODE OF RECURRENT MAJOR DEPRESSIVE DISORDER (HCC): Primary | ICD-10-CM

## 2023-07-13 PROCEDURE — 90834 PSYTX W PT 45 MINUTES: CPT | Performed by: SOCIAL WORKER

## 2023-07-13 NOTE — PROGRESS NOTES
ADULT BEHAVIORAL HEALTH FOLLOW UP  BORIS Murdock  Licensed Independent          Visit Date: 7/13/2023   Time of appointment: 1:00 PM     Time spent with Patient: 55 minutes. Reason for Consult:  Anxiety and Depression       PCP:  Elyssa Stewart MD      Maria Guadalupe Yap is a 35 y.o. female who presents for follow up of depression and anxiety. Hayley Villa presented to session with self-report of improvement in her mood, anger, and communication skills. She expressed feeling awareness of her own feelings has helped her better interact with her children and communicate in a calm manner. Hayley Villa shared she has already noticed the positive effects of this, as her son has been following directions and wanting to help more, and her daughter has also been picking up on this. Hayley Villa shared her primary stress has been trying to encourage her  to also work on improving his behavior. Hayley Villa admits that she does become anxious at times when his anger escalates, and she does not want this to be taken out on the children. Hayley Villa and [facility] discussed importance of maintaining the children's safety at length, in the event that her  does escalate his behavior physically and/or verbally. Hayley Villa was in agreement with importance of maintaining their safety and speaking with her  through assertive communication skills discussed, to address her concerns and eliminate this behavior. Hayley Villa shared she has been struggling with not being able to get him to be on the same page as her when it comes to growth, as she identified various ideas and goals she had for the children and family, and she often expresses he appears more disengaged and passive. Previous Recommendations:   1) Follow-up appointment scheduled on 7/13 @ 1 PM to discuss progress with managing stress, depression, and anxiety.     2) Discussed goal of identifying/quantifying number of rooms that Hayley Villa will then know when she should

## 2023-08-10 ENCOUNTER — OFFICE VISIT (OUTPATIENT)
Dept: BEHAVIORAL/MENTAL HEALTH CLINIC | Age: 33
End: 2023-08-10
Payer: COMMERCIAL

## 2023-08-10 DIAGNOSIS — F33.1 MODERATE EPISODE OF RECURRENT MAJOR DEPRESSIVE DISORDER (HCC): Primary | ICD-10-CM

## 2023-08-10 PROCEDURE — 1036F TOBACCO NON-USER: CPT | Performed by: SOCIAL WORKER

## 2023-08-10 PROCEDURE — 90834 PSYTX W PT 45 MINUTES: CPT | Performed by: SOCIAL WORKER

## 2023-08-11 NOTE — PROGRESS NOTES
ADULT BEHAVIORAL HEALTH FOLLOW UP  RiverBORIS Lainez  Licensed Independent          Visit Date: 8/10/2023   Time of appointment: 2:04 PM     Time spent with Patient: 45 minutes. Reason for Consult:  Depression       PCP:  Hortencia Vargas MD      Gregory Gomes is a 35 y.o. female who presents for follow up of depression. Jonelle Carr reported depressive symptoms have been stable. She has felt more confident in herself lately, has enjoyed work and feels appreciated there. She expressed significant frustration with her , who Jonelle Carr reported she feels has been demonstrating child-like behaviors. She shared that he had commented that she may be becoming too \"mature\" for him due to the growth that she has made. Jonelle Carr explained this has been difficult as she is working on reinforcing positive changes not only within herself, but also her children. She also expressed feeling upset that he has wanted to take certain things she enjoys away from her, such as working, as she shared her  has expressed he would rather she only stay home and take care of the kids if he obtains a better paying job. Jonelle Carr shared she has noticed since returning to work she has felt happier, more fulfilled, and feels she has a better understanding of a routine/structure for herself and the kids, she expressed giving up her job is not something that she feels interested in doing. Previous Recommendations:   Jonelle Carr was scheduled for follow-up appointment on 8/27 @ 2 PM. Jonelle Carr is also planning to call the Medicaid hotline to change insurance benefits. MENTAL STATUS EXAM  Mood was within normal limits with calm affect. Suicidal ideation was denied. Homicidal ideation was denied. Hygiene was good . Dress was appropriate. Behavior was Within Normal Limits with No observation or self-report of difficulties ambulating. Attitude was Engageable. Eye-contact was good.   Speech: rate - WNL, rhythm -

## 2023-08-15 PROBLEM — H90.3 SENSORINEURAL HEARING LOSS (SNHL) OF BOTH EARS: Status: ACTIVE | Noted: 2023-03-15

## 2023-08-16 ENCOUNTER — HOSPITAL ENCOUNTER (OUTPATIENT)
Age: 33
Setting detail: SPECIMEN
Discharge: HOME | End: 2023-08-16

## 2023-08-16 DIAGNOSIS — Z13.220 LIPID SCREENING: ICD-10-CM

## 2023-08-16 DIAGNOSIS — R42 DIZZINESS: ICD-10-CM

## 2023-08-16 PROBLEM — D69.1 PLATELET DYSFUNCTION (HCC): Status: RESOLVED | Noted: 2019-03-01 | Resolved: 2023-08-16

## 2023-08-16 LAB
BASOPHILS # BLD: 0.11 K/UL (ref 0–0.2)
BASOPHILS NFR BLD: 2 % (ref 0–2)
EOSINOPHIL # BLD: 0.25 K/UL (ref 0–0.44)
EOSINOPHILS RELATIVE PERCENT: 3 % (ref 1–4)
ERYTHROCYTE [DISTWIDTH] IN BLOOD BY AUTOMATED COUNT: 12.1 % (ref 11.8–14.4)
HCT VFR BLD AUTO: 42 % (ref 36.3–47.1)
HGB BLD-MCNC: 13.6 G/DL (ref 11.9–15.1)
IMM GRANULOCYTES # BLD AUTO: <0.03 K/UL (ref 0–0.3)
IMM GRANULOCYTES NFR BLD: 0 %
LYMPHOCYTES NFR BLD: 3.21 K/UL (ref 1.1–3.7)
LYMPHOCYTES RELATIVE PERCENT: 44 % (ref 24–43)
MCH RBC QN AUTO: 29.6 PG (ref 25.2–33.5)
MCHC RBC AUTO-ENTMCNC: 32.4 G/DL (ref 28.4–34.8)
MCV RBC AUTO: 91.3 FL (ref 82.6–102.9)
MONOCYTES NFR BLD: 0.45 K/UL (ref 0.1–1.2)
MONOCYTES NFR BLD: 6 % (ref 3–12)
NEUTROPHILS NFR BLD: 45 % (ref 36–65)
NEUTS SEG NFR BLD: 3.25 K/UL (ref 1.5–8.1)
NRBC BLD-RTO: 0 PER 100 WBC
PLATELET # BLD AUTO: 273 K/UL (ref 138–453)
PMV BLD AUTO: 10.8 FL (ref 8.1–13.5)
RBC # BLD AUTO: 4.6 M/UL (ref 3.95–5.11)
WBC OTHER # BLD: 7.3 K/UL (ref 3.5–11.3)

## 2023-08-17 LAB
ALBUMIN SERPL-MCNC: 4.6 G/DL (ref 3.5–5.2)
ALBUMIN/GLOB SERPL: 1.8 {RATIO} (ref 1–2.5)
ALP SERPL-CCNC: 61 U/L (ref 35–104)
ALT SERPL-CCNC: 17 U/L (ref 5–33)
ANION GAP SERPL CALCULATED.3IONS-SCNC: 15 MMOL/L (ref 9–17)
AST SERPL-CCNC: 24 U/L
BILIRUB SERPL-MCNC: 0.3 MG/DL (ref 0.3–1.2)
BUN SERPL-MCNC: 11 MG/DL (ref 6–20)
CALCIUM SERPL-MCNC: 9 MG/DL (ref 8.6–10.4)
CHLORIDE SERPL-SCNC: 98 MMOL/L (ref 98–107)
CHOLEST SERPL-MCNC: 185 MG/DL
CHOLESTEROL/HDL RATIO: 2.6
CO2 SERPL-SCNC: 25 MMOL/L (ref 20–31)
CREAT SERPL-MCNC: 0.7 MG/DL (ref 0.5–0.9)
GFR SERPL CREATININE-BSD FRML MDRD: >60 ML/MIN/1.73M2
GLUCOSE SERPL-MCNC: 77 MG/DL (ref 70–99)
HDLC SERPL-MCNC: 71 MG/DL
LDLC SERPL CALC-MCNC: 99 MG/DL (ref 0–130)
POTASSIUM SERPL-SCNC: 4.4 MMOL/L (ref 3.7–5.3)
PROT SERPL-MCNC: 7.2 G/DL (ref 6.4–8.3)
SODIUM SERPL-SCNC: 138 MMOL/L (ref 135–144)
TRIGL SERPL-MCNC: 73 MG/DL

## 2023-09-07 ENCOUNTER — OFFICE VISIT (OUTPATIENT)
Dept: BEHAVIORAL/MENTAL HEALTH CLINIC | Age: 33
End: 2023-09-07
Payer: COMMERCIAL

## 2023-09-07 DIAGNOSIS — F33.1 MODERATE EPISODE OF RECURRENT MAJOR DEPRESSIVE DISORDER (HCC): Primary | ICD-10-CM

## 2023-09-07 DIAGNOSIS — F41.9 ANXIETY: ICD-10-CM

## 2023-09-07 PROCEDURE — 1036F TOBACCO NON-USER: CPT | Performed by: SOCIAL WORKER

## 2023-09-07 PROCEDURE — 90834 PSYTX W PT 45 MINUTES: CPT | Performed by: SOCIAL WORKER

## 2023-09-07 NOTE — PROGRESS NOTES
ADULT BEHAVIORAL HEALTH FOLLOW UP  BORIS Jeffers  Licensed Independent          Visit Date: 9/7/2023   Time of appointment: 2:57 PM     Time spent with Patient: 51 minutes. Reason for Consult:  Anxiety and Depression       PCP:  Didier Quintana MD      Eddie Remy is a 35 y.o. female who presents for follow up of depression. Salas Morel shared she has been having a difficult time with irregular periods, she is planning to see OBGYN to discuss further. She expressed this has also impacted energy levels and mood, making it harder to cope with everything at home. She reported she has been very stressed, not sure what will happen with family. She shared frustration with partner, who lashed out verbally at their son. Salas Morel reported she attempted to address her 's behavior because she doesn't want this to happen, and also does not want to fight in front of their children. Salas Morel reported she has had thoughts of moving to her 's mother's home with the kids until they can work through things. Salas Morel shared she has been talking herself through fleeting thoughts of SI that have come up periodically. She reported when SI comes up, her cats have been helpful and are usually around. She has been avoidance of burning herself while in the kitchen, as this was a previous behavior Salas Morel reported when feeling depressed. Salas Morel shared she is more aware of depressive episodes before they hit and contracts for safety. Previous Recommendations: Follow-up appointment scheduled in 1 month. MENTAL STATUS EXAM  Mood was within normal limits with calm affect. Suicidal ideation was denied at time of session. However, She discussed fleeting SI, no plan to harm herself or others. Homicidal ideation was denied. Hygiene was good . Dress was appropriate. Behavior was Within Normal Limits with No observation or self-report of difficulties ambulating.    Attitude was

## 2023-10-03 ENCOUNTER — OFFICE VISIT (OUTPATIENT)
Dept: BEHAVIORAL/MENTAL HEALTH CLINIC | Age: 33
End: 2023-10-03
Payer: COMMERCIAL

## 2023-10-03 DIAGNOSIS — F41.9 ANXIETY: ICD-10-CM

## 2023-10-03 DIAGNOSIS — F33.1 MODERATE EPISODE OF RECURRENT MAJOR DEPRESSIVE DISORDER (HCC): Primary | ICD-10-CM

## 2023-10-03 PROCEDURE — 90834 PSYTX W PT 45 MINUTES: CPT | Performed by: SOCIAL WORKER

## 2023-10-03 PROCEDURE — 1036F TOBACCO NON-USER: CPT | Performed by: SOCIAL WORKER

## 2023-10-03 NOTE — PROGRESS NOTES
ADULT BEHAVIORAL HEALTH FOLLOW UP  BORIS Cotter  Licensed Independent          Visit Date: 10/3/2023   Time of appointment: 10:02 AM     Time spent with Patient: 48 minutes. Reason for Consult:  Anxiety and Depression       PCP:  Renetta Vargas MD      Nara Morales is a 35 y.o. female who presents for follow up of depression and anxiety. Christina Curiel reported depressive and anxious symptoms continue to be present, and worsen in accordance with menstrual cycle. She has been using a period tracker to document symptoms. Christina Curiel reported she has been noticing symptoms are significantly worse 2-3 days before her cycle. Christina Curiel reported her symptoms tend to start hitting a couple weeks before her period begins, the symptoms worsen in severity before she starts to feel a bit better before beginning her period. She reported her last couple cycles were on the heavier side. She also endorsed insomnia, nightmares, trouble falling asleep and staying asleep,. She also reported she finds herself having stronger cravings for sweets or chocolates, tendency to want to get it even if she knows she shouldn't be spending money, she elaborated the urge is still there because she feels like she needs it sometimes. She reported following the cycle beginning, her appetite then diminishes and she has a harder time wanting to eat. Christina Curiel reported she was on birth control when she was younger to balance her cycles due to irregular periods. She reported still finding desire to pick/scratch her arms, she stated these thoughts become uncontrollable at times of wanting to pick, she reported a couple weeks ago she did scratch to point of bleeding. She reported she has been wearing gloves at times and keeping herself distracted as been helpful to avoid acting on the thoughts. She finds it is hard to stay distracted if she is not constantly moving. Christina Curiel reported she has also noticed more desire to drink alcohol.

## 2023-10-11 ENCOUNTER — OFFICE VISIT (OUTPATIENT)
Dept: OBGYN CLINIC | Age: 33
End: 2023-10-11
Payer: COMMERCIAL

## 2023-10-11 ENCOUNTER — HOSPITAL ENCOUNTER (OUTPATIENT)
Age: 33
Setting detail: SPECIMEN
Discharge: HOME | End: 2023-10-11

## 2023-10-11 VITALS
BODY MASS INDEX: 25.3 KG/M2 | HEIGHT: 61 IN | SYSTOLIC BLOOD PRESSURE: 120 MMHG | WEIGHT: 134 LBS | DIASTOLIC BLOOD PRESSURE: 82 MMHG

## 2023-10-11 DIAGNOSIS — N89.8 VAGINAL ODOR: ICD-10-CM

## 2023-10-11 DIAGNOSIS — Z01.419 WELL WOMAN EXAM WITH ROUTINE GYNECOLOGICAL EXAM: Primary | ICD-10-CM

## 2023-10-11 DIAGNOSIS — R10.2 PELVIC PAIN: ICD-10-CM

## 2023-10-11 DIAGNOSIS — Z11.51 SPECIAL SCREENING EXAMINATION FOR HUMAN PAPILLOMAVIRUS (HPV): ICD-10-CM

## 2023-10-11 PROCEDURE — G8484 FLU IMMUNIZE NO ADMIN: HCPCS | Performed by: NURSE PRACTITIONER

## 2023-10-11 PROCEDURE — 99385 PREV VISIT NEW AGE 18-39: CPT | Performed by: NURSE PRACTITIONER

## 2023-10-11 NOTE — PROGRESS NOTES
Nutritional decision making was discussed. Skin:  There was a Normal Inspection of the skin without rashes or lesions. There were no rashes. (Papular, Maculopapular, Hives, Pustular, Macular)     There were no lesions (Ulcers, Erythema, Abn. Appearing Nevi)            Lymphatic:  No Lymph Nodes were Palpable in the neck , axilla or groin.  0 # Of Lymph Nodes; Location ; Character [Normal]  [Shotty] [Tender] [Enlarged]     Neck and EENT:  The neck was supple. There were no masses   The thyroid was not enlarged and had no masses. Perrla, EOMI B/L, TMI B/L No Abnormalities. Throat inspected-No exudates or Masses, Nares Patent No Masses        Respiratory: The lungs were auscultated and found to be clear. There were no rales, rhonchi or wheezes. There was a good respiratory effort. Cardiovascular: The heart was in a regular rate and rhythm. . No S3 or S4. There was no murmur appreciated. Location, grade, and radiation are not applicable. Extremities: The patients extremities were without calf tenderness, edema, or varicosities. There was full range of motion in all four extremities. Pulses in all four extremities were appreciated and are 2/4. Abdomen: The abdomen was soft and non-tender. There were good bowel sounds in all quadrants and there was no guarding, rebound or rigidity. On evaluation there was no evidence of hepatosplenomegaly and there was no costal vertebral kristel tenderness bilaterally. No hernias were appreciated. Abdominal Scars: intact    Psych: The patient had a normal Orientation to: Time, Place, Person, and Situation  There is no Mood / Affect changes    Breast:  (Chest)  normal appearance, no masses or tenderness, Inspection negative, No nipple retraction or dimpling, No nipple discharge or bleeding, No axillary or supraclavicular adenopathy, Normal to palpation without dominant masses  Self breast exams were reviewed in detail. Literature was given.     Pelvic

## 2023-10-13 ENCOUNTER — TELEPHONE (OUTPATIENT)
Dept: OBGYN CLINIC | Age: 33
End: 2023-10-13

## 2023-10-13 RX ORDER — METRONIDAZOLE 500 MG/1
500 TABLET ORAL 2 TIMES DAILY
Qty: 14 TABLET | Refills: 0 | Status: SHIPPED | OUTPATIENT
Start: 2023-10-13 | End: 2023-10-20

## 2023-10-13 NOTE — TELEPHONE ENCOUNTER
----- Message from DARRELL Erwin - NP sent at 10/12/2023  3:48 PM EDT -----  + BV  Flagyl 500mg PO BID x 7 days

## 2023-10-13 NOTE — TELEPHONE ENCOUNTER
Patient notified of results and recommendations, script for flagyl to be sent to [facility].

## 2023-10-14 LAB
HPV I/H RISK 4 DNA CVX QL NAA+PROBE: NOT DETECTED
HPV SAMPLE: NORMAL
HPV, INTERPRETATION: NORMAL
HPV16 DNA CVX QL NAA+PROBE: NOT DETECTED
HPV18 DNA CVX QL NAA+PROBE: NOT DETECTED
SPECIMEN DESCRIPTION: NORMAL

## 2023-10-21 LAB — CYTOLOGY REPORT: NORMAL

## 2023-10-26 ENCOUNTER — OFFICE VISIT (OUTPATIENT)
Dept: BEHAVIORAL/MENTAL HEALTH CLINIC | Age: 33
End: 2023-10-26
Payer: COMMERCIAL

## 2023-10-26 DIAGNOSIS — F41.9 ANXIETY: ICD-10-CM

## 2023-10-26 DIAGNOSIS — F33.1 MODERATE EPISODE OF RECURRENT MAJOR DEPRESSIVE DISORDER (HCC): Primary | ICD-10-CM

## 2023-10-26 PROCEDURE — 1036F TOBACCO NON-USER: CPT | Performed by: SOCIAL WORKER

## 2023-10-26 PROCEDURE — 90834 PSYTX W PT 45 MINUTES: CPT | Performed by: SOCIAL WORKER

## 2023-10-26 NOTE — PROGRESS NOTES
ADULT BEHAVIORAL HEALTH FOLLOW UP  BORIS Causey  Licensed Independent          Visit Date: 10/26/2023   Time of appointment: 2:54 PM       Time spent with Patient: 45 minutes. Reason for Consult:  Anxiety and Depression       PCP:  Nimco Friend, MD Ad Hernandez is a 35 y.o. female who presents for follow up of depression and anxiety. Pee Ruth reported she asked her partner to remove the tweezers from the bathroom. She reported he had noticed she has been spending longer amount of time in bathroom. She expressed knowing this is not healthy for her to do and he was glad that she opened up to why she has been distant. She reported she does love her , but finds getting over their hurdles poses to still be challenging. She expressed feeling things are getting a bit better with not drinking alcohol, but she found this to be difficult, Pee Ruth shared watching her  drink made her realize she can no longer drink. She feels improving with this as when she smelled alcohol recently it was easier to avoid returning to use. She reported work has been ok, relationships with children she is still working on - she has been doing a lot of baking with the kids. Pee Ruth stated, \"it has been hard to really think positive when the cloud are covering the demetra\". Pee Ruth reported her last cycle was around 10/8, she felt she was definitely more impulsive with her cleaning and having a hard time stopping cleaning once she starts. She expressed feeling she goes from 0 to 100 with impulsivity then. Previous Recommendations:   -Pee Ruth was scheduled for follow-up appointment on 9/28 @ 3 PM.   [facility] encouraged Pee Ruth to follow-up with OBGYN and PCP for all medical concerns she expressed. [employer] [facility] also encouraged Pee Ruth to call our office to schedule sooner appointment if depression worsens prior to next appointment.      Crisis Resources discussed:  Suicide Prevention Lifeline: \"826\" call or

## 2023-11-01 ENCOUNTER — PROCEDURE VISIT (OUTPATIENT)
Dept: OBGYN CLINIC | Age: 33
End: 2023-11-01

## 2023-11-01 DIAGNOSIS — R10.2 PELVIC PAIN: ICD-10-CM

## 2023-11-02 ENCOUNTER — TELEPHONE (OUTPATIENT)
Dept: OBGYN CLINIC | Age: 33
End: 2023-11-02

## 2023-11-02 PROBLEM — Q51.3 BICORNATE UTERUS: Status: ACTIVE | Noted: 2023-11-02

## 2023-11-02 NOTE — TELEPHONE ENCOUNTER
----- Message from DARRELL Marrufo - NP sent at 11/2/2023  8:35 AM EDT -----  Bicornuate uterus noted  Determine if still having pelvic pain- was tx'ed for BV  If still having pain need appt for follow up

## 2023-11-16 ENCOUNTER — OFFICE VISIT (OUTPATIENT)
Dept: OBGYN CLINIC | Age: 33
End: 2023-11-16
Payer: COMMERCIAL

## 2023-11-16 ENCOUNTER — OFFICE VISIT (OUTPATIENT)
Dept: BEHAVIORAL/MENTAL HEALTH CLINIC | Age: 33
End: 2023-11-16
Payer: COMMERCIAL

## 2023-11-16 VITALS
BODY MASS INDEX: 25.49 KG/M2 | SYSTOLIC BLOOD PRESSURE: 112 MMHG | WEIGHT: 135 LBS | DIASTOLIC BLOOD PRESSURE: 68 MMHG | HEIGHT: 61 IN

## 2023-11-16 DIAGNOSIS — F41.9 ANXIETY: ICD-10-CM

## 2023-11-16 DIAGNOSIS — F33.1 MODERATE EPISODE OF RECURRENT MAJOR DEPRESSIVE DISORDER (HCC): Primary | ICD-10-CM

## 2023-11-16 DIAGNOSIS — Z32.02 NEGATIVE PREGNANCY TEST: ICD-10-CM

## 2023-11-16 DIAGNOSIS — N94.6 DYSMENORRHEA, UNSPECIFIED: Primary | ICD-10-CM

## 2023-11-16 LAB
CONTROL: NORMAL
PREGNANCY TEST URINE, POC: NEGATIVE

## 2023-11-16 PROCEDURE — 90834 PSYTX W PT 45 MINUTES: CPT | Performed by: SOCIAL WORKER

## 2023-11-16 PROCEDURE — 81025 URINE PREGNANCY TEST: CPT | Performed by: OBSTETRICS & GYNECOLOGY

## 2023-11-16 PROCEDURE — 99213 OFFICE O/P EST LOW 20 MIN: CPT | Performed by: OBSTETRICS & GYNECOLOGY

## 2023-11-16 PROCEDURE — 1036F TOBACCO NON-USER: CPT | Performed by: SOCIAL WORKER

## 2023-11-16 PROCEDURE — G8427 DOCREV CUR MEDS BY ELIG CLIN: HCPCS | Performed by: OBSTETRICS & GYNECOLOGY

## 2023-11-16 PROCEDURE — G8484 FLU IMMUNIZE NO ADMIN: HCPCS | Performed by: OBSTETRICS & GYNECOLOGY

## 2023-11-16 PROCEDURE — G8419 CALC BMI OUT NRM PARAM NOF/U: HCPCS | Performed by: OBSTETRICS & GYNECOLOGY

## 2023-11-16 PROCEDURE — 1036F TOBACCO NON-USER: CPT | Performed by: OBSTETRICS & GYNECOLOGY

## 2023-11-16 RX ORDER — MEDROXYPROGESTERONE ACETATE 150 MG/ML
150 INJECTION, SUSPENSION INTRAMUSCULAR ONCE
Status: COMPLETED | OUTPATIENT
Start: 2023-11-16 | End: 2023-11-16

## 2023-11-16 RX ADMIN — MEDROXYPROGESTERONE ACETATE 150 MG: 150 INJECTION, SUSPENSION INTRAMUSCULAR at 11:58

## 2023-11-16 NOTE — PROGRESS NOTES
MENTAL STATUS EXAM  Mood was depressed with flat affect. Suicidal ideation was reported (passive/fleeting thoughts). Melissa Buchanan admitted to  that she was having thoughts of self-harm and he removed any items from the bathroom that they felt were of risk. She denied having plan to take her own life. She denied intent to act on these thoughts. Crisis Resources provided to patient. Homicidal ideation was denied. Hygiene was good . Dress was appropriate. Behavior was Within Normal Limits with No observation or self-report of difficulties ambulating. Attitude was Engageable. Eye-contact was good. Speech: rate - WNL, rhythm - WNL, volume - WNL. Verbalizations were goal directed. Thought processes were intact and goal-oriented without evidence of delusions, hallucinations, obsessions, or pradeep; with little cognitive distortions. Associations were characterized by intact cognitive processes. Pt was oriented to person, place, time, and general circumstances;  recent:  good. Insight and judgment were estimated to be good, AEB, a fair  understanding of cyclical maladaptive patterns, and the ability to use insight to inform behavior change. [address] [place] [address] presented to the appointment today for evaluation and treatment of symptoms of depression. She is currently deemed low to moderate risk to herself or others and meets criteria for Major Depressive Disorder and Anxiety. Melissa Buchanan endorsed continuation of anxious and depressive symptoms, primary stressor involves relationship challenges that impede family system/behavioral changes that Melissa Buchanan is trying to make to support the children. Melissa Buchanan expressed frustration with her partner not being as open as she would like to work on some of these challenges to improve the family dynamic, and Melissa Buchanan shared noticing behaviors arise in their son as result of witnessing his father demonstrate certain behaviors.  [facility] recommended and reviewed

## 2023-11-16 NOTE — PROGRESS NOTES
Per  depo provera 150mg given R deltoid, lot [id number], negative upt [id number].
LABORATORIES  CONSULTING PATHOLOGISTS [facility]  ANATOMIC PATHOLOGY  [facility] KENN Darling. Michelle, [address] [facility]  [phone]  Fax: [phone]     Human papillomavirus (HPV) DNA probe thin prep high risk   Result Value Ref Range    Specimen Description CERVICAL MATERIAL     HPV Sample . THIN PREP     HPV, Genotype 16 Not Detected Not Detected    HPV, Genotype 18 Not Detected Not Detected    HPV, High Risk Other Not Detected Not Detected    HPV, Interpretation               Assessment:   Diagnosis Orders   1. Dysmenorrhea, unspecified          Patient Active Problem List    Diagnosis Date Noted    LSIL (low grade squamous intraepithelial lesion) on Pap smear 4/2013 09/09/2013     Priority: High     Repeat PAP 9/9/2013 during pregnancy intake      Dermoid cyst of neck 10/27/2022     Priority: Medium    Major depressive disorder with current active episode 09/29/2022     Priority: Medium    Hearing aid worn 09/09/2013     Priority: Low    Bicornate uterus 11/02/2023    Sensorineural hearing loss (SNHL) of both ears 03/15/2023    Hearing loss of both ears      due to birth defect      Bleeding tendency ([address]) 03/01/2019    History of miscarriage 09/01/2013       Counseling Hormonal Based Birth Control:      The patient was seen and counseled on all forms of birth control both male and female  reversible and non. She is aware that hormonal based birth control may increase her risk of developing a blood clot which may increase her morbidity and or mortality. She was counseled on alternate non hormonal based contraception options. We discussed that smoking and any hormonal based contraception may increase the patients risks of developing these life threatening blood clots. All patients are encouraged to stop smoking at the time of contraceptive counseling. Cessation programs were reviewed. The patient was instructed to use barrier contraception for sexually transmitted disease prevention.   The patient was also

## 2023-12-28 ENCOUNTER — OFFICE VISIT (OUTPATIENT)
Dept: BEHAVIORAL/MENTAL HEALTH CLINIC | Age: 33
End: 2023-12-28
Payer: COMMERCIAL

## 2023-12-28 DIAGNOSIS — F33.1 MODERATE EPISODE OF RECURRENT MAJOR DEPRESSIVE DISORDER (HCC): Primary | ICD-10-CM

## 2023-12-28 DIAGNOSIS — F41.9 ANXIETY: ICD-10-CM

## 2023-12-28 PROCEDURE — 90834 PSYTX W PT 45 MINUTES: CPT | Performed by: SOCIAL WORKER

## 2023-12-28 PROCEDURE — 1036F TOBACCO NON-USER: CPT | Performed by: SOCIAL WORKER

## 2023-12-28 NOTE — PROGRESS NOTES
ADULT BEHAVIORAL HEALTH FOLLOW UP  Vivian CorreaENE medinaZULEYKA  Licensed Independent          Visit Date: 12/28/2023   Time of appointment: 2:50 PM       Time spent with Patient: 52 minutes.     Reason for Consult:  Anxiety and Depression       PCP:  Roosevelt Cheng MD      SUBJECTIVE  Mary Jane is a 33 y.o. female who presents for follow up of depression and anxiety.     Mary Jane reported her hours were cut at work, she is now no longer working. She reported her  did get a job. Mary Jane reported relationship strain has worsened. She reported she has experienced suicidal thoughts with no active plan, however she admits to having self-harming thoughts of using a knife to feel something. She reported she started plucking hair on her legs again. Mary Jane reported her  does own a pistol, she stated this is locked up in the home. She denied other thoughts of self-harm at this time. Mary Jane reported these thoughts usually happen if they are in the other room separate from the children and her  is \"messing with me\" or if she is cleaning he house and \"I can tell he is just flat out ignoring what I'm doing\" and not helping her. Mary Jane reported her  often doesn't offer to help or put effort into the relationship with the exception of pursing Mary Jane romantically - which she reports she has been uncomfortable with on a frequent basis. Mary Jane reported he has started to try to do this even in public spaces in the home and she has asked him to stop. Mary Jane reported significant lack of social support; she identified her mother as a possible source of support, she lives about 30 minutes away from her. She also identified a potential support could be sister-in-law.     Previous Recommendations:   Mary Jane was scheduled for follow-up on 12/14 @ 10 AM. Patient advised to call our office if symptoms worsen prior to next appointment to be scheduled sooner. Crisis Resources reviewed.     Call or Text \"[phone]\" to be

## 2024-01-24 ENCOUNTER — HOSPITAL ENCOUNTER (OUTPATIENT)
Age: 34
Setting detail: SPECIMEN
Discharge: HOME | End: 2024-01-24

## 2024-01-24 DIAGNOSIS — R23.2 HOT FLASHES: ICD-10-CM

## 2024-01-24 DIAGNOSIS — R00.2 PALPITATIONS: ICD-10-CM

## 2024-01-25 LAB
ACTH PLAS-MCNC: 6 PG/ML (ref 7–63)
FSH SERPL-ACNC: 7.5 MIU/ML
LH SERPL-ACNC: 8.8 MIU/ML (ref 1.7–8.6)
T4 FREE SERPL-MCNC: 1.2 NG/DL (ref 0.93–1.7)
TSH SERPL DL<=0.05 MIU/L-ACNC: 1.3 UIU/ML (ref 0.27–4.2)

## 2024-01-30 ENCOUNTER — TELEMEDICINE (OUTPATIENT)
Dept: BEHAVIORAL/MENTAL HEALTH CLINIC | Age: 34
End: 2024-01-30
Payer: COMMERCIAL

## 2024-01-30 DIAGNOSIS — F33.1 MODERATE EPISODE OF RECURRENT MAJOR DEPRESSIVE DISORDER (HCC): Primary | ICD-10-CM

## 2024-01-30 DIAGNOSIS — F41.9 ANXIETY: ICD-10-CM

## 2024-01-30 PROCEDURE — 90834 PSYTX W PT 45 MINUTES: CPT | Performed by: SOCIAL WORKER

## 2024-01-30 PROCEDURE — 1036F TOBACCO NON-USER: CPT | Performed by: SOCIAL WORKER

## 2024-01-30 NOTE — PROGRESS NOTES
ADULT BEHAVIORAL HEALTH FOLLOW UP  Vivian BORIS Macias  Licensed Independent          Visit Date: 1/30/2024   Time of appointment: 11:07 AM        Time spent with Patient: 46 minutes.   *This was a tele-health visit    Reason for Consult:  Anxiety and Depression       PCP:  Roosevelt Cheng MD      SUBJECTIVE  Mary Jane is a 33 y.o. female who presents for follow up of depression and anxiety.     Mary Jane reported she saw her DR for follow-up on the 24th, she reported her anxiety medication was increased. She reported feeling the new medication is helping and she can tell a difference when she takes it. She reported communication has improved with partner.  She has been having trouble sleeping at night, she had vivid dreams last night. She reported she discussed panic attacks with PCP. She has still been working to manage intrusive and/or self-harming thoughts. She has tried to agustina to keep her hands busy, and is trying to get back into playing video games. She reported it has been hard to enjoy things and feeling numb or detached. She reported she has noticed it has also been difficult for her to have desire to play with the kids. Mary Jane reported she has noticed a history of attention/concentration difficulties throughout time, at home and at school. She reported she recalls being called lazy or others thought she was unwilling to complete her school work, she elaborated she would often post pone a project until the last second/procrastinate. She also reported she would feel overwhelmed with how to complete the work, which would lead her to emotionally shut down. Mary Jane reported in elementary school her grades suffered as a result.     Previous Recommendations:   Follow-up appointment scheduled on 1/18 @ 3 PM. Patient was advised to call our office for sooner appointment if depression worsens prior to next visit.  *Patient is advised to call 911 and/or go to nearest ER if thoughts of harming herself

## 2024-02-08 ENCOUNTER — NURSE ONLY (OUTPATIENT)
Dept: OBGYN CLINIC | Age: 34
End: 2024-02-08
Payer: COMMERCIAL

## 2024-02-08 VITALS — DIASTOLIC BLOOD PRESSURE: 74 MMHG | SYSTOLIC BLOOD PRESSURE: 116 MMHG

## 2024-02-08 DIAGNOSIS — Z32.02 NEGATIVE PREGNANCY TEST: ICD-10-CM

## 2024-02-08 DIAGNOSIS — N94.6 DYSMENORRHEA, UNSPECIFIED: Primary | ICD-10-CM

## 2024-02-08 LAB
CONTROL: NORMAL
PREGNANCY TEST URINE, POC: NEGATIVE

## 2024-02-08 PROCEDURE — 96372 THER/PROPH/DIAG INJ SC/IM: CPT | Performed by: NURSE PRACTITIONER

## 2024-02-08 PROCEDURE — 81025 URINE PREGNANCY TEST: CPT | Performed by: NURSE PRACTITIONER

## 2024-02-08 RX ORDER — MEDROXYPROGESTERONE ACETATE 150 MG/ML
150 INJECTION, SUSPENSION INTRAMUSCULAR ONCE
Status: COMPLETED | OUTPATIENT
Start: 2024-02-08 | End: 2024-02-08

## 2024-02-08 RX ADMIN — MEDROXYPROGESTERONE ACETATE 150 MG: 150 INJECTION, SUSPENSION INTRAMUSCULAR at 11:50

## 2024-02-13 ENCOUNTER — HOSPITAL ENCOUNTER (OUTPATIENT)
Age: 34
Discharge: HOME | End: 2024-02-15
Attending: STUDENT IN AN ORGANIZED HEALTH CARE EDUCATION/TRAINING PROGRAM
Payer: COMMERCIAL

## 2024-02-13 DIAGNOSIS — R00.2 PALPITATIONS: ICD-10-CM

## 2024-02-13 PROCEDURE — 93242 EXT ECG>48HR<7D RECORDING: CPT

## 2024-02-20 ENCOUNTER — OFFICE VISIT (OUTPATIENT)
Dept: BEHAVIORAL/MENTAL HEALTH CLINIC | Age: 34
End: 2024-02-20
Payer: COMMERCIAL

## 2024-02-20 DIAGNOSIS — F41.9 ANXIETY: ICD-10-CM

## 2024-02-20 DIAGNOSIS — F33.1 MODERATE EPISODE OF RECURRENT MAJOR DEPRESSIVE DISORDER (HCC): Primary | ICD-10-CM

## 2024-02-20 PROCEDURE — 90834 PSYTX W PT 45 MINUTES: CPT | Performed by: SOCIAL WORKER

## 2024-02-20 PROCEDURE — 1036F TOBACCO NON-USER: CPT | Performed by: SOCIAL WORKER

## 2024-02-20 NOTE — PROGRESS NOTES
ADULT BEHAVIORAL HEALTH FOLLOW UP  Vivian BORIS Macias  Licensed Independent          Visit Date: 2/20/2024   Time of appointment: 12:53 PM        Time spent with Patient: 50 minutes.     Reason for Consult:  Anxiety and Depression       PCP:  Roosevelt Cheng MD      SUBJECTIVE  Mary Jane is a 33 y.o. female who presents for follow up of depression and anxiety.     Mary Jane reported she has been having increased stress. She was feeling hopeful about partner getting in with a therapist, she states this has helped a little. Mary Jane reported the family has been worried about finances again due to her  abruptly quitting his job. Mary Jane reported he quit his job because he made other staff uncomfortable, due to multiple reports to management because of his behavior she states that the manager spoke with his  to inform him. Mary Jane reported her  told her that he was then uncomfortable that others were feeling this way and quit instead of looking for alternative employment first or attempting to make amends with staff at work. Mary Jane shared this was disappointing as she felt he was doing well with the job, she expressed frustration with this placing pressure on her to consider returning back to work without a plan for this due to his frequent ending of employment without notice.       Previous Recommendations:   Follow-up appointment scheduled on 2/20 @ 1 PM in office.   *Patient is advised to call 911 and/or go to nearest ER if thoughts of harming herself and/or others arises.  *Patient may contact \"988\" via call or text 24/7 to be linked with a crisis counselor for support.    [facility] Crisis [facility]: [phone]  [address] [facility] [address]  Monday-Friday 10AM-6PM  Saturday 10 AM-3PM    [facility]: [phone]  [address] [place] [id number]  Monday-Friday 10AM-6PM    National Suicide Prevention Lifeline: Call or Text “988” 24/7    Text “HOME” to 738789 to

## 2024-03-19 ENCOUNTER — OFFICE VISIT (OUTPATIENT)
Dept: BEHAVIORAL/MENTAL HEALTH CLINIC | Age: 34
End: 2024-03-19
Payer: COMMERCIAL

## 2024-03-19 DIAGNOSIS — F33.1 MODERATE EPISODE OF RECURRENT MAJOR DEPRESSIVE DISORDER (HCC): Primary | ICD-10-CM

## 2024-03-19 DIAGNOSIS — F41.9 ANXIETY: ICD-10-CM

## 2024-03-19 PROCEDURE — 90837 PSYTX W PT 60 MINUTES: CPT | Performed by: SOCIAL WORKER

## 2024-03-19 PROCEDURE — 1036F TOBACCO NON-USER: CPT | Performed by: SOCIAL WORKER

## 2024-03-19 NOTE — PROGRESS NOTES
ADULT BEHAVIORAL HEALTH FOLLOW UP  BORIS Carrera  Licensed Independent          Visit Date: 3/19/2024   Time of appointment: 1:04 PM        Time spent with Patient: 55 minutes.     Reason for Consult:  Anxiety and Depression       PCP:  Roosevelt Cheng MD      SUBJECTIVE  Mary Jane is a 34 y.o. female who presents for follow up of depression and anxiety.     Mary Jane reported she has been having a hard time managing finances, which has increased her stress level. Mary Jane reported her partner only returned to work 1 week ago after quitting his job. Mary Jane shared her  still does not demonstrate remorse for his behavior that led him to leaving the job. Mary Jane shared she has concerns about moving in with her mother-in-law, but the family is considering this due to financial stress. Mary Jane expressed she has been having a harder time regulating her emotions due to constant worry about finances.       Previous Recommendations:   Follow-up appointment scheduled on 3/19 @ 1 PM.     [facility] encouraged Mary Jane to schedule appointment with Psychiatrist of her choice for medication evaluation due to increased depression. Psychiatry referrals provided.     Parent Stress Resource:  https://parentshelpingparents.org/stressline    *If thoughts of harming yourself and/or others arise, please call 911 and/or go to your nearest ER. You can also call/text \"322\" 24/7 to be linked with a crisis counselor.     MENTAL STATUS EXAM  Mood was depressed with flat affect.   Suicidal ideation was denied.  Homicidal ideation was denied.    Hygiene was good .  Dress was appropriate.   Behavior was Within Normal Limits with No observation or self-report of difficulties ambulating.   Attitude was Engageable.  Eye-contact was good.  Speech: rate - WNL, rhythm - WNL, volume - WNL.  Verbalizations were goal directed.  Thought processes were intact and goal-oriented without evidence of delusions, hallucinations, obsessions, or pradeep;

## 2024-05-02 ENCOUNTER — OFFICE VISIT (OUTPATIENT)
Dept: OBGYN CLINIC | Age: 34
End: 2024-05-02
Payer: COMMERCIAL

## 2024-05-02 VITALS
SYSTOLIC BLOOD PRESSURE: 118 MMHG | HEIGHT: 60 IN | DIASTOLIC BLOOD PRESSURE: 76 MMHG | BODY MASS INDEX: 29.84 KG/M2 | WEIGHT: 152 LBS

## 2024-05-02 DIAGNOSIS — Z32.02 NEGATIVE PREGNANCY TEST: Primary | ICD-10-CM

## 2024-05-02 DIAGNOSIS — N94.6 DYSMENORRHEA: ICD-10-CM

## 2024-05-02 LAB
CONTROL: NORMAL
PREGNANCY TEST URINE, POC: NEGATIVE

## 2024-05-02 PROCEDURE — 81025 URINE PREGNANCY TEST: CPT | Performed by: OBSTETRICS & GYNECOLOGY

## 2024-05-02 PROCEDURE — 96372 THER/PROPH/DIAG INJ SC/IM: CPT | Performed by: OBSTETRICS & GYNECOLOGY

## 2024-05-02 RX ORDER — MEDROXYPROGESTERONE ACETATE 150 MG/ML
150 INJECTION, SUSPENSION INTRAMUSCULAR ONCE
Status: COMPLETED | OUTPATIENT
Start: 2024-05-02 | End: 2024-05-02

## 2024-05-02 RX ADMIN — MEDROXYPROGESTERONE ACETATE 150 MG: 150 INJECTION, SUSPENSION INTRAMUSCULAR at 12:36

## 2024-05-02 NOTE — PROGRESS NOTES
Per Dr. Harden pt given Depo Provera 150 mg IM in her left deltoid. Pt tolerated injection well. Pt scheduled for next injection in 12 weeks.     Darwin: [id number]  Exp: 10/31/25  Last annual 10/11/23

## 2024-07-08 ENCOUNTER — HOSPITAL ENCOUNTER (OUTPATIENT)
Age: 34
Setting detail: SPECIMEN
Discharge: HOME | End: 2024-07-08

## 2024-07-08 DIAGNOSIS — R30.0 DYSURIA: ICD-10-CM

## 2024-07-09 LAB
MICROORGANISM SPEC CULT: NORMAL
SERVICE CMNT-IMP: NORMAL
SPECIMEN DESCRIPTION: NORMAL

## 2024-07-25 ENCOUNTER — NURSE ONLY (OUTPATIENT)
Dept: OBGYN CLINIC | Age: 34
End: 2024-07-25
Payer: COMMERCIAL

## 2024-07-25 VITALS
HEIGHT: 60 IN | DIASTOLIC BLOOD PRESSURE: 84 MMHG | WEIGHT: 162 LBS | SYSTOLIC BLOOD PRESSURE: 124 MMHG | BODY MASS INDEX: 31.8 KG/M2

## 2024-07-25 DIAGNOSIS — N94.6 DYSMENORRHEA: Primary | ICD-10-CM

## 2024-07-25 DIAGNOSIS — Z32.02 NEGATIVE PREGNANCY TEST: ICD-10-CM

## 2024-07-25 LAB
CONTROL: NORMAL
PREGNANCY TEST URINE, POC: NEGATIVE

## 2024-07-25 PROCEDURE — 81025 URINE PREGNANCY TEST: CPT | Performed by: NURSE PRACTITIONER

## 2024-07-25 PROCEDURE — 96372 THER/PROPH/DIAG INJ SC/IM: CPT | Performed by: NURSE PRACTITIONER

## 2024-07-25 RX ORDER — MEDROXYPROGESTERONE ACETATE 150 MG/ML
150 INJECTION, SUSPENSION INTRAMUSCULAR ONCE
Status: COMPLETED | OUTPATIENT
Start: 2024-07-25 | End: 2024-07-25

## 2024-07-25 RX ADMIN — MEDROXYPROGESTERONE ACETATE 150 MG: 150 INJECTION, SUSPENSION INTRAMUSCULAR at 13:04

## 2024-07-25 NOTE — PROGRESS NOTES
Per Madelyn MEJIA, pt given depo-provera 150mg IM to right deltoid; lot#[id number] exp 10/31/25.  UPT negative.  Tolerated well.  To return in 12 weeks for next depo with annual exam.  Pt verbalized understanding.

## 2024-10-17 ENCOUNTER — OFFICE VISIT (OUTPATIENT)
Dept: OBGYN CLINIC | Age: 34
End: 2024-10-17
Payer: COMMERCIAL

## 2024-10-17 ENCOUNTER — HOSPITAL ENCOUNTER (OUTPATIENT)
Age: 34
Setting detail: SPECIMEN
Discharge: HOME | End: 2024-10-17

## 2024-10-17 VITALS
HEIGHT: 61 IN | SYSTOLIC BLOOD PRESSURE: 118 MMHG | DIASTOLIC BLOOD PRESSURE: 80 MMHG | WEIGHT: 162 LBS | BODY MASS INDEX: 30.58 KG/M2

## 2024-10-17 DIAGNOSIS — Z01.419 WELL FEMALE EXAM WITH ROUTINE GYNECOLOGICAL EXAM: Primary | ICD-10-CM

## 2024-10-17 DIAGNOSIS — Z32.02 NEGATIVE PREGNANCY TEST: ICD-10-CM

## 2024-10-17 DIAGNOSIS — R10.2 PELVIC PAIN: ICD-10-CM

## 2024-10-17 DIAGNOSIS — Z11.51 SPECIAL SCREENING EXAMINATION FOR HUMAN PAPILLOMAVIRUS (HPV): ICD-10-CM

## 2024-10-17 DIAGNOSIS — N94.6 DYSMENORRHEA: ICD-10-CM

## 2024-10-17 DIAGNOSIS — N89.8 VAGINAL ODOR: ICD-10-CM

## 2024-10-17 LAB
CONTROL: NORMAL
PREGNANCY TEST URINE, POC: NEGATIVE

## 2024-10-17 PROCEDURE — G8427 DOCREV CUR MEDS BY ELIG CLIN: HCPCS | Performed by: NURSE PRACTITIONER

## 2024-10-17 PROCEDURE — 81025 URINE PREGNANCY TEST: CPT | Performed by: NURSE PRACTITIONER

## 2024-10-17 PROCEDURE — 99395 PREV VISIT EST AGE 18-39: CPT | Performed by: NURSE PRACTITIONER

## 2024-10-17 PROCEDURE — G8484 FLU IMMUNIZE NO ADMIN: HCPCS | Performed by: NURSE PRACTITIONER

## 2024-10-17 PROCEDURE — 1036F TOBACCO NON-USER: CPT | Performed by: NURSE PRACTITIONER

## 2024-10-17 PROCEDURE — G8417 CALC BMI ABV UP PARAM F/U: HCPCS | Performed by: NURSE PRACTITIONER

## 2024-10-17 PROCEDURE — 99213 OFFICE O/P EST LOW 20 MIN: CPT | Performed by: NURSE PRACTITIONER

## 2024-10-17 RX ORDER — MEDROXYPROGESTERONE ACETATE 150 MG/ML
150 INJECTION, SUSPENSION INTRAMUSCULAR ONCE
Status: COMPLETED | OUTPATIENT
Start: 2024-10-17 | End: 2024-10-17

## 2024-10-17 RX ADMIN — MEDROXYPROGESTERONE ACETATE 150 MG: 150 INJECTION, SUSPENSION INTRAMUSCULAR at 11:52

## 2024-10-17 NOTE — PROGRESS NOTES
History and Physical  [facility]  [facility] [address]  [place], [place]  [address] [phone]   Fax [phone]  Mary Jane Celis  10/17/2024              34 y.o.  Chief Complaint   Patient presents with    Annual Exam    Injections       No LMP recorded. Patient has had an injection.             Primary Care Physician: Roosevelt Cheng MD    The patient was seen and examined. She has no chief complaint today and is here for her annual exam.  Her bowels are regular. There are no voiding complaints. She denies any bloating.  She denies vaginal discharge and was counseled on STD's and the need for barrier contraception.     HPI : Mary Jane Celis is a 34 y.o. female     Annual exam  C/o left lower abdominal pain x 2 days- feeling of a \"heaviness\"  Desires to continue on depo  States she hasn't had a menses since December after starting depo until she started at the beginning of this month- this is when the odor started   C/o vaginal odor x 2 weeks    no Bloating  no Early Satiety  no Unexplained weight change of more than 15 lbs  no  PMB  no  PCB  ________________________________________________________________________  OB History    Para Term  AB Living   3 2 2 0 1 2   SAB IAB Ectopic Molar Multiple Live Births   1 0 0 0 0 2      # Outcome Date GA Lbr Андрей/2nd Weight Sex Type Anes PTL Lv   3 Term 06/11/15 40w0d  4.097 kg (9 lb 0.5 oz) F  Spinal  ITZ      Birth Comments: Education information given to mother and she verbalizes understanding about the following:  Hour for Family Beginnings pamphlet.  Patient Safety Education.  Infant security including the four band system and the HUGS system.  Skin to Skin Contact for Yo      Name: GIOVANNI       Apgar1: 8  Apgar5: 9   2 Term 12 39w2d  3.941 kg (8 lb 11 oz) M Vag-Spont  N ITZ      Apgar1: 8  Apgar5: 9   1 SAB              Past Medical History:   Diagnosis Date    Anemia complicating pregnancy

## 2024-10-25 LAB — CYTOLOGY REPORT: NORMAL

## 2025-01-09 ENCOUNTER — NURSE ONLY (OUTPATIENT)
Dept: OBGYN CLINIC | Age: 35
End: 2025-01-09
Payer: COMMERCIAL

## 2025-01-09 VITALS
HEART RATE: 75 BPM | BODY MASS INDEX: 31.34 KG/M2 | SYSTOLIC BLOOD PRESSURE: 118 MMHG | WEIGHT: 166 LBS | HEIGHT: 61 IN | DIASTOLIC BLOOD PRESSURE: 86 MMHG

## 2025-01-09 DIAGNOSIS — N94.6 DYSMENORRHEA: Primary | ICD-10-CM

## 2025-01-09 DIAGNOSIS — Z32.02 NEGATIVE PREGNANCY TEST: ICD-10-CM

## 2025-01-09 LAB
CONTROL: NORMAL
PREGNANCY TEST URINE, POC: NEGATIVE

## 2025-01-09 PROCEDURE — 96372 THER/PROPH/DIAG INJ SC/IM: CPT | Performed by: NURSE PRACTITIONER

## 2025-01-09 PROCEDURE — 81025 URINE PREGNANCY TEST: CPT | Performed by: NURSE PRACTITIONER

## 2025-01-09 RX ORDER — MEDROXYPROGESTERONE ACETATE 150 MG/ML
150 INJECTION, SUSPENSION INTRAMUSCULAR ONCE
Status: COMPLETED | OUTPATIENT
Start: 2025-01-09 | End: 2025-01-09

## 2025-01-09 RX ADMIN — MEDROXYPROGESTERONE ACETATE 150 MG: 150 INJECTION, SUSPENSION INTRAMUSCULAR at 10:29

## 2025-01-09 NOTE — PROGRESS NOTES
Per Madelyn Nuñez depo provera given in her right delt. LOT [id number] EXP 3/26. Upt negative. To return in 12 weeks for next injection.

## 2025-01-10 PROBLEM — L60.0 INGROWING TOENAIL WITH INFECTION: Status: ACTIVE | Noted: 2025-01-10

## 2025-03-11 ENCOUNTER — OFFICE VISIT (OUTPATIENT)
Dept: PODIATRY | Age: 35
End: 2025-03-11
Payer: COMMERCIAL

## 2025-03-11 VITALS — HEIGHT: 61 IN | BODY MASS INDEX: 31.34 KG/M2 | WEIGHT: 166 LBS

## 2025-03-11 DIAGNOSIS — L03.031 PARONYCHIA OF GREAT TOE OF RIGHT FOOT: Primary | ICD-10-CM

## 2025-03-11 DIAGNOSIS — M79.674 PAIN IN TOE OF RIGHT FOOT: ICD-10-CM

## 2025-03-11 DIAGNOSIS — L60.0 ONYCHOCRYPTOSIS: ICD-10-CM

## 2025-03-11 PROCEDURE — G8417 CALC BMI ABV UP PARAM F/U: HCPCS | Performed by: PODIATRIST

## 2025-03-11 PROCEDURE — G8427 DOCREV CUR MEDS BY ELIG CLIN: HCPCS | Performed by: PODIATRIST

## 2025-03-11 PROCEDURE — 1036F TOBACCO NON-USER: CPT | Performed by: PODIATRIST

## 2025-03-11 PROCEDURE — 99203 OFFICE O/P NEW LOW 30 MIN: CPT | Performed by: PODIATRIST

## 2025-03-11 PROCEDURE — 11750 EXCISION NAIL&NAIL MATRIX: CPT | Performed by: PODIATRIST

## 2025-03-11 ASSESSMENT — ENCOUNTER SYMPTOMS
BACK PAIN: 0
DIARRHEA: 0
COLOR CHANGE: 0
SHORTNESS OF BREATH: 0
NAUSEA: 0

## 2025-03-11 NOTE — PROGRESS NOTES
abduction of the hallux of the right foot.     There is no prominence noted to the first metatarsal head without abduction of the hallux of the left foot.    Shoe examination was performed.    Biomechanical Exam: normal bilaterally.    Asessment: Patient is a 35 y.o. female with:    Diagnosis Orders   1. Paronychia of great toe of right foot  REMOVAL OF NAIL MATRIX      2. Onychocryptosis  REMOVAL OF NAIL MATRIX      3. Pain in toe of right foot  REMOVAL OF NAIL MATRIX          Plan:  1. Clinical evaluation of the patient. 2.  I recommended a partial nail avulsion with chemical matricectomy to the medial borders of the right hallux. A consent was signed and placed in the chart. The right hallux was anesthetized with 3 cc of 0.5% Marcaine plain. A tourniquet was placed at the base to the toe. The area was then prepped and draped in an aseptic manner. A hemostat was then utilized to elevate the medial and proximal skin folds away from the underlying nail plate border. The hemostat was then utilized to elevate the medial nail plate border away from the underlying nailbed. A [place] blade was then utilized to excise this nail plate border. A hemostat was utilized to remove this nail plate border from the field. A curette was utilized to ensure that all nail remnants were removed. The nail matrix in this area was then treated with 3 applications of 89% phenol for 15 seconds each. The area was then irrigated with copious amounts of sterile saline solution. The wound was dressed with antibiotic oinment and a dry sterile dressing. The patient was given post-operative soaking and dressing instructions. The patient was encouraged to call or come in if any concerns arise.  3. Contact office with any questions/problems/concerns.  Return in about 2 weeks (around 3/25/2025) for First postop matricectomy right hallux.   3/11/2025      Mohit Rowe DPM

## 2025-03-25 ENCOUNTER — OFFICE VISIT (OUTPATIENT)
Dept: PODIATRY | Age: 35
End: 2025-03-25
Payer: COMMERCIAL

## 2025-03-25 VITALS — HEIGHT: 61 IN | WEIGHT: 166 LBS | BODY MASS INDEX: 31.34 KG/M2

## 2025-03-25 DIAGNOSIS — M79.674 PAIN IN TOE OF RIGHT FOOT: ICD-10-CM

## 2025-03-25 DIAGNOSIS — L60.0 ONYCHOCRYPTOSIS: ICD-10-CM

## 2025-03-25 DIAGNOSIS — L03.031 PARONYCHIA OF GREAT TOE OF RIGHT FOOT: Primary | ICD-10-CM

## 2025-03-25 PROCEDURE — G8427 DOCREV CUR MEDS BY ELIG CLIN: HCPCS | Performed by: PODIATRIST

## 2025-03-25 PROCEDURE — 99213 OFFICE O/P EST LOW 20 MIN: CPT | Performed by: PODIATRIST

## 2025-03-25 PROCEDURE — G8417 CALC BMI ABV UP PARAM F/U: HCPCS | Performed by: PODIATRIST

## 2025-03-25 PROCEDURE — 1036F TOBACCO NON-USER: CPT | Performed by: PODIATRIST

## 2025-03-25 ASSESSMENT — ENCOUNTER SYMPTOMS
NAUSEA: 0
COLOR CHANGE: 0
SHORTNESS OF BREATH: 0
DIARRHEA: 0
BACK PAIN: 0

## 2025-03-25 NOTE — PROGRESS NOTES
[facility]  Return Patient Progress Note    Subjective: Mary Jane Celis 35 y.o. female that presents for follow up evaluation of removal of ingrown nail right great toe.  Chief Complaint   Patient presents with    Nail Problem     Right great toe     Patient's treatment thus far has included daily dressing changes with antibiotic ointment and a Band-Aid.  Pain is rated 0 out of 10 and is described as none. Patient has been following my prescribed course of therapy as instructed.     Review of Systems   Constitutional:  Negative for activity change, appetite change, chills, diaphoresis, fatigue and fever.   Respiratory:  Negative for shortness of breath.    Cardiovascular:  Negative for leg swelling.   Gastrointestinal:  Negative for diarrhea and nausea.   Endocrine: Negative for cold intolerance, heat intolerance and polyuria.   Musculoskeletal:  Negative for arthralgias, back pain, gait problem, joint swelling and myalgias.   Skin:  Positive for wound. Negative for color change, pallor and rash.   Allergic/Immunologic: Negative for environmental allergies and food allergies.   Neurological:  Negative for dizziness, weakness, light-headedness and numbness.   Hematological:  Does not bruise/bleed easily.   Psychiatric/Behavioral:  Negative for behavioral problems, confusion and self-injury. The patient is not nervous/anxious.        Objective: Clinical evaluation of the patient reveals absence to the Medial border of the right hallux nail plate. There is no erythema or edema remaining to the right hallux. There is loose eschar noted to the Medial border of the right hallux and this is easily removed with a sterile gauze. The nail bed to this area is granular and nearly healed at this time. There is no drainage or malodor noted to the right hallux. There is mild pain with palpation and manipulation of the right hallux.    Assessment:    Diagnosis Orders   1. Paronychia of great toe of right foot

## 2025-04-03 ENCOUNTER — CLINICAL SUPPORT (OUTPATIENT)
Dept: OBGYN CLINIC | Age: 35
End: 2025-04-03
Payer: COMMERCIAL

## 2025-04-03 VITALS
HEIGHT: 61 IN | BODY MASS INDEX: 31.53 KG/M2 | SYSTOLIC BLOOD PRESSURE: 122 MMHG | WEIGHT: 167 LBS | RESPIRATION RATE: 16 BRPM | DIASTOLIC BLOOD PRESSURE: 82 MMHG | HEART RATE: 85 BPM

## 2025-04-03 DIAGNOSIS — Z32.02 NEGATIVE PREGNANCY TEST: ICD-10-CM

## 2025-04-03 DIAGNOSIS — N94.6 DYSMENORRHEA: Primary | ICD-10-CM

## 2025-04-03 LAB
CONTROL: NORMAL
PREGNANCY TEST URINE, POC: NEGATIVE

## 2025-04-03 PROCEDURE — 96372 THER/PROPH/DIAG INJ SC/IM: CPT | Performed by: NURSE PRACTITIONER

## 2025-04-03 PROCEDURE — 81025 URINE PREGNANCY TEST: CPT | Performed by: NURSE PRACTITIONER

## 2025-04-03 RX ORDER — MEDROXYPROGESTERONE ACETATE 150 MG/ML
150 INJECTION, SUSPENSION INTRAMUSCULAR ONCE
Status: COMPLETED | OUTPATIENT
Start: 2025-04-03 | End: 2025-04-03

## 2025-04-03 RX ADMIN — MEDROXYPROGESTERONE ACETATE 150 MG: 150 INJECTION, SUSPENSION INTRAMUSCULAR at 08:45

## 2025-04-03 NOTE — PROGRESS NOTES
4/3/25  8:43 AM    Medication Nurse Documentation Form    Patient Name: Mary Jane Celis  MRN #: [id number]      Ordering Provider: Madelyn Nuñez  Medication Given: Depo Provera  Dose: 150 mg  Route: IM  Location of Injection: left delt    Lot #: [id number]  Expiration Date: 05/31/2026    Pregnancy Confirmation as Negative before Injection: Yes      Recommendations:  RTO 10-12 weeks for Injection of Depot Provera if patient wishes to continue medication  Barrier recommendations and STD counseling completed  Next Annual is due on 10/17/2025  See MAR for further information          Electronically signed by Shavonne Pizarro on 4/3/2025 at 8:43 AM

## 2025-06-29 SDOH — ECONOMIC STABILITY: INCOME INSECURITY: IN THE LAST 12 MONTHS, WAS THERE A TIME WHEN YOU WERE NOT ABLE TO PAY THE MORTGAGE OR RENT ON TIME?: YES

## 2025-06-29 SDOH — ECONOMIC STABILITY: FOOD INSECURITY: WITHIN THE PAST 12 MONTHS, THE FOOD YOU BOUGHT JUST DIDN'T LAST AND YOU DIDN'T HAVE MONEY TO GET MORE.: SOMETIMES TRUE

## 2025-06-29 SDOH — ECONOMIC STABILITY: FOOD INSECURITY: WITHIN THE PAST 12 MONTHS, YOU WORRIED THAT YOUR FOOD WOULD RUN OUT BEFORE YOU GOT MONEY TO BUY MORE.: SOMETIMES TRUE

## 2025-06-29 SDOH — ECONOMIC STABILITY: TRANSPORTATION INSECURITY
IN THE PAST 12 MONTHS, HAS THE LACK OF TRANSPORTATION KEPT YOU FROM MEDICAL APPOINTMENTS OR FROM GETTING MEDICATIONS?: NO

## 2025-06-29 SDOH — ECONOMIC STABILITY: TRANSPORTATION INSECURITY
IN THE PAST 12 MONTHS, HAS LACK OF TRANSPORTATION KEPT YOU FROM MEETINGS, WORK, OR FROM GETTING THINGS NEEDED FOR DAILY LIVING?: NO

## 2025-06-29 ASSESSMENT — COLUMBIA-SUICIDE SEVERITY RATING SCALE - C-SSRS
1. IN THE PAST MONTH, HAVE YOU WISHED YOU WERE DEAD OR WISHED YOU COULD GO TO SLEEP AND NOT WAKE UP?: NO
2. IN THE PAST MONTH, HAVE YOU ACTUALLY HAD ANY THOUGHTS OF KILLING YOURSELF?: NO
6. IN YOUR LIFETIME, HAVE YOU EVER DONE ANYTHING, STARTED TO DO ANYTHING, OR PREPARED TO DO ANYTHING TO END YOUR LIFE?: NO

## 2025-06-29 ASSESSMENT — PATIENT HEALTH QUESTIONNAIRE - PHQ9
10. IF YOU CHECKED OFF ANY PROBLEMS, HOW DIFFICULT HAVE THESE PROBLEMS MADE IT FOR YOU TO DO YOUR WORK, TAKE CARE OF THINGS AT HOME, OR GET ALONG WITH OTHER PEOPLE: EXTREMELY DIFFICULT
9. THOUGHTS THAT YOU WOULD BE BETTER OFF DEAD, OR OF HURTING YOURSELF: MORE THAN HALF THE DAYS
2. FEELING DOWN, DEPRESSED OR HOPELESS: MORE THAN HALF THE DAYS
SUM OF ALL RESPONSES TO PHQ QUESTIONS 1-9: 22
SUM OF ALL RESPONSES TO PHQ QUESTIONS 1-9: 22
6. FEELING BAD ABOUT YOURSELF - OR THAT YOU ARE A FAILURE OR HAVE LET YOURSELF OR YOUR FAMILY DOWN: MORE THAN HALF THE DAYS
SUM OF ALL RESPONSES TO PHQ QUESTIONS 1-9: 20
9. THOUGHTS THAT YOU WOULD BE BETTER OFF DEAD, OR OF HURTING YOURSELF: MORE THAN HALF THE DAYS
3. TROUBLE FALLING OR STAYING ASLEEP: NEARLY EVERY DAY
5. POOR APPETITE OR OVEREATING: NEARLY EVERY DAY
8. MOVING OR SPEAKING SO SLOWLY THAT OTHER PEOPLE COULD HAVE NOTICED. OR THE OPPOSITE, BEING SO FIGETY OR RESTLESS THAT YOU HAVE BEEN MOVING AROUND A LOT MORE THAN USUAL: NEARLY EVERY DAY
4. FEELING TIRED OR HAVING LITTLE ENERGY: MORE THAN HALF THE DAYS
6. FEELING BAD ABOUT YOURSELF - OR THAT YOU ARE A FAILURE OR HAVE LET YOURSELF OR YOUR FAMILY DOWN: MORE THAN HALF THE DAYS
1. LITTLE INTEREST OR PLEASURE IN DOING THINGS: NEARLY EVERY DAY
SUM OF ALL RESPONSES TO PHQ QUESTIONS 1-9: 22
5. POOR APPETITE OR OVEREATING: NEARLY EVERY DAY
7. TROUBLE CONCENTRATING ON THINGS, SUCH AS READING THE NEWSPAPER OR WATCHING TELEVISION: MORE THAN HALF THE DAYS
10. IF YOU CHECKED OFF ANY PROBLEMS, HOW DIFFICULT HAVE THESE PROBLEMS MADE IT FOR YOU TO DO YOUR WORK, TAKE CARE OF THINGS AT HOME, OR GET ALONG WITH OTHER PEOPLE: EXTREMELY DIFFICULT
8. MOVING OR SPEAKING SO SLOWLY THAT OTHER PEOPLE COULD HAVE NOTICED. OR THE OPPOSITE - BEING SO FIDGETY OR RESTLESS THAT YOU HAVE BEEN MOVING AROUND A LOT MORE THAN USUAL: NEARLY EVERY DAY
4. FEELING TIRED OR HAVING LITTLE ENERGY: MORE THAN HALF THE DAYS
3. TROUBLE FALLING OR STAYING ASLEEP: NEARLY EVERY DAY
1. LITTLE INTEREST OR PLEASURE IN DOING THINGS: NEARLY EVERY DAY
SUM OF ALL RESPONSES TO PHQ QUESTIONS 1-9: 22
2. FEELING DOWN, DEPRESSED OR HOPELESS: MORE THAN HALF THE DAYS
7. TROUBLE CONCENTRATING ON THINGS, SUCH AS READING THE NEWSPAPER OR WATCHING TELEVISION: MORE THAN HALF THE DAYS

## 2025-06-30 ENCOUNTER — OFFICE VISIT (OUTPATIENT)
Dept: OBGYN CLINIC | Age: 35
End: 2025-06-30
Payer: COMMERCIAL

## 2025-06-30 VITALS
DIASTOLIC BLOOD PRESSURE: 78 MMHG | HEIGHT: 61 IN | BODY MASS INDEX: 31.72 KG/M2 | SYSTOLIC BLOOD PRESSURE: 122 MMHG | HEART RATE: 86 BPM | WEIGHT: 168 LBS | RESPIRATION RATE: 18 BRPM

## 2025-06-30 DIAGNOSIS — Z82.49 FAMILY HISTORY OF DVT: Primary | ICD-10-CM

## 2025-06-30 DIAGNOSIS — N94.6 PAINFUL MENSTRUATION: ICD-10-CM

## 2025-06-30 DIAGNOSIS — N92.0 MENORRHAGIA WITH REGULAR CYCLE: ICD-10-CM

## 2025-06-30 DIAGNOSIS — F32.A DEPRESSION, UNSPECIFIED DEPRESSION TYPE: ICD-10-CM

## 2025-06-30 PROCEDURE — G8427 DOCREV CUR MEDS BY ELIG CLIN: HCPCS | Performed by: NURSE PRACTITIONER

## 2025-06-30 PROCEDURE — 99213 OFFICE O/P EST LOW 20 MIN: CPT | Performed by: NURSE PRACTITIONER

## 2025-06-30 PROCEDURE — G8417 CALC BMI ABV UP PARAM F/U: HCPCS | Performed by: NURSE PRACTITIONER

## 2025-06-30 PROCEDURE — 1036F TOBACCO NON-USER: CPT | Performed by: NURSE PRACTITIONER

## 2025-06-30 NOTE — PROGRESS NOTES
the office in [facility] weeks.  Counseled on preventative health maintenance follow-up.  Orders Placed This Encounter   Procedures    Antithrombin 3 Activity     Standing Status:   Future     Expected Date:   6/30/2025     Expiration Date:   12/30/2025    Antithrombin 3 Antigen     Standing Status:   Future     Expected Date:   6/30/2025     Expiration Date:   12/30/2025    Factor 5 Leiden     Standing Status:   Future     Expected Date:   7/14/2025     Expiration Date:   6/30/2026     Factor V Specimen:   BLOOD    Homocysteine     Standing Status:   Future     Expected Date:   6/30/2025     Expiration Date:   12/30/2025    Lupus Anticoagulant Reflex Panel     Standing Status:   Future     Expected Date:   7/14/2025     Expiration Date:   6/30/2026    MTHFR Mutation [id number]     Standing Status:   Future     Expected Date:   7/14/2025     Expiration Date:   6/30/2026    Protein C Antigen, Total     Standing Status:   Future     Expected Date:   6/30/2025     Expiration Date:   12/30/2025    Protein C Functional     Standing Status:   Future     Expected Date:   7/7/2025     Expiration Date:   7/30/2025    Protein S Activity     Standing Status:   Future     Expected Date:   7/7/2025     Expiration Date:   7/30/2025    Protein S Antigen, Free     Standing Status:   Future     Expected Date:   7/7/2025     Expiration Date:   7/30/2025    Prothrombin 55988 Mutation Invader     Standing Status:   Future     Expected Date:   7/14/2025     Expiration Date:   6/30/2026    T. pallidum Ab     Standing Status:   Future     Expected Date:   7/7/2025     Expiration Date:   7/30/2025    Sjogrens syndrome-A extractable nuclear antibody     Standing Status:   Future     Expected Date:   7/7/2025     Expiration Date:   7/30/2025    Sjogrens syndrome-B extractable nuclear antibody     Standing Status:   Future     Expected Date:   7/7/2025     Expiration Date:   7/30/2025    ALICIA     Standing Status:   Future     Expected Date:    yes

## 2025-07-03 ENCOUNTER — HOSPITAL ENCOUNTER (OUTPATIENT)
Age: 35
Discharge: HOME | End: 2025-07-03
Payer: COMMERCIAL

## 2025-07-03 DIAGNOSIS — Z82.49 FAMILY HISTORY OF DVT: ICD-10-CM

## 2025-07-03 LAB
HCYS SERPL-SCNC: 11.2 UMOL/L (ref 0–15)
T PALLIDUM AB SER QL IA: NONREACTIVE

## 2025-07-03 PROCEDURE — 86225 DNA ANTIBODY NATIVE: CPT

## 2025-07-03 PROCEDURE — 86780 TREPONEMA PALLIDUM: CPT

## 2025-07-03 PROCEDURE — 85301 ANTITHROMBIN III ANTIGEN: CPT

## 2025-07-03 PROCEDURE — 86038 ANTINUCLEAR ANTIBODIES: CPT

## 2025-07-03 PROCEDURE — 83090 ASSAY OF HOMOCYSTEINE: CPT

## 2025-07-03 PROCEDURE — 85610 PROTHROMBIN TIME: CPT

## 2025-07-03 PROCEDURE — 85300 ANTITHROMBIN III ACTIVITY: CPT

## 2025-07-03 PROCEDURE — 85306 CLOT INHIBIT PROT S FREE: CPT

## 2025-07-03 PROCEDURE — 81241 F5 GENE: CPT

## 2025-07-03 PROCEDURE — 81291 MTHFR GENE: CPT

## 2025-07-03 PROCEDURE — 85730 THROMBOPLASTIN TIME PARTIAL: CPT

## 2025-07-03 PROCEDURE — 86146 BETA-2 GLYCOPROTEIN ANTIBODY: CPT

## 2025-07-03 PROCEDURE — 85613 RUSSELL VIPER VENOM DILUTED: CPT

## 2025-07-03 PROCEDURE — 36415 COLL VENOUS BLD VENIPUNCTURE: CPT

## 2025-07-03 PROCEDURE — 86235 NUCLEAR ANTIGEN ANTIBODY: CPT

## 2025-07-03 PROCEDURE — 86147 CARDIOLIPIN ANTIBODY EA IG: CPT

## 2025-07-03 PROCEDURE — 81240 F2 GENE: CPT

## 2025-07-03 PROCEDURE — 85302 CLOT INHIBIT PROT C ANTIGEN: CPT

## 2025-07-03 PROCEDURE — 85303 CLOT INHIBIT PROT C ACTIVITY: CPT

## 2025-07-04 LAB
ANA SER QL IA: NEGATIVE
B2 GLYCOPROT1 IGA SERPL IA-ACNC: <0.3 ELISA U/ML (ref 0–7)
B2 GLYCOPROT1 IGG SERPL IA-ACNC: <0.6 ELISA U/ML (ref 0–7)
B2 GLYCOPROT1 IGM SERPL IA-ACNC: <0.9 ELISA U/ML (ref 0–7)
CARDIOLIPIN IGA SER IA-ACNC: 1.5 APL (ref 0–14)
CARDIOLIPIN IGG SER IA-ACNC: <0.5 GPL (ref 0–10)
CARDIOLIPIN IGM SER IA-ACNC: 3.7 MPL (ref 0–10)
DILUTE RUSSELL VIPER VENOM TIME: NORMAL
DSDNA IGG SER QL IA: <0.5 IU/ML
INR PPP: 1
LUPUS ANTICOAG: NORMAL
NUCLEAR IGG SER IA-RTO: <0.1 U/ML
PARTIAL THROMBOPLASTIN TIME: 29.7 SEC (ref 24–36)
PROTHROMBIN TIME: 13.7 SEC (ref 11.8–14.6)

## 2025-07-05 LAB
AT III AG ACT/NOR PPP IA: 82 % (ref 82–136)
PROT C AG ACT/NOR PPP IA: 71 % (ref 63–153)

## 2025-07-06 LAB — PROT S FREE AG ACT/NOR PPP IA: 76 % (ref 55–123)

## 2025-07-07 ENCOUNTER — RESULTS FOLLOW-UP (OUTPATIENT)
Dept: OBGYN CLINIC | Age: 35
End: 2025-07-07

## 2025-07-07 LAB
AT III ACT/NOR PPP CHRO: 99 % (ref 83–122)
CARDIOLIPIN IGA SER IA-ACNC: 1.5 APL (ref 0–14)
CARDIOLIPIN IGG SER IA-ACNC: <0.5 GPL (ref 0–10)
CARDIOLIPIN IGM SER IA-ACNC: 3.7 MPL (ref 0–10)
DILUTE RUSSELL VIPER VENOM TIME: NORMAL
INR PPP: 1
PARTIAL THROMBOPLASTIN TIME: 29.7 SEC (ref 24–36)
PROTEIN C ACTIVITY: 74 %
PROTEIN S ACTIVITY: 72 % (ref 59–130)
PROTHROMBIN TIME: 13.7 SEC (ref 11.8–14.6)

## 2025-07-08 LAB
ENA SS-A IGG SER QL: <0.3 U/ML
ENA SS-B IGG SER IA-ACNC: <0.3 U/ML

## 2025-07-08 NOTE — TELEPHONE ENCOUNTER
----- Message from DARRELL Chirinos NP sent at 7/7/2025  8:42 AM EDT -----  Refer to hematology for decreased protein C activity  Desires to start on hormonal control of menses

## 2025-07-09 LAB
MTHFR INTERPRETATION: NORMAL
MTHFR MUTATION A1286C: NORMAL
MTHFR MUTATION C665T: NEGATIVE
SPECIMEN: NORMAL

## 2025-07-10 LAB
F2 C.20210G>A GENO BLD/T: NEGATIVE
F5 P.R506Q BLD/T QL: NEGATIVE
SPECIMEN SOURCE: NORMAL
SPECIMEN SOURCE: NORMAL

## 2025-07-15 DIAGNOSIS — D68.59 PROTEIN C DEFICIENCY: Primary | ICD-10-CM

## 2025-07-15 DIAGNOSIS — Z15.89 COMPOUND HETEROZYGOUS MTHFR MUTATION C677T/A1298C: ICD-10-CM

## 2025-07-15 DIAGNOSIS — Z82.49 FAMILY HISTORY OF DVT: ICD-10-CM

## 2025-08-06 ENCOUNTER — TELEPHONE (OUTPATIENT)
Age: 35
End: 2025-08-06

## 2025-08-06 ENCOUNTER — INITIAL CONSULT (OUTPATIENT)
Age: 35
End: 2025-08-06
Payer: COMMERCIAL

## 2025-08-06 VITALS
HEIGHT: 61 IN | SYSTOLIC BLOOD PRESSURE: 129 MMHG | RESPIRATION RATE: 16 BRPM | HEART RATE: 90 BPM | BODY MASS INDEX: 31.79 KG/M2 | WEIGHT: 168.4 LBS | TEMPERATURE: 97.5 F | DIASTOLIC BLOOD PRESSURE: 89 MMHG

## 2025-08-06 DIAGNOSIS — Z15.89 HETEROZYGOUS MTHFR MUTATION A1298C: Primary | ICD-10-CM

## 2025-08-06 PROCEDURE — G8417 CALC BMI ABV UP PARAM F/U: HCPCS | Performed by: INTERNAL MEDICINE

## 2025-08-06 PROCEDURE — 99244 OFF/OP CNSLTJ NEW/EST MOD 40: CPT | Performed by: INTERNAL MEDICINE

## 2025-08-06 PROCEDURE — 99214 OFFICE O/P EST MOD 30 MIN: CPT | Performed by: INTERNAL MEDICINE

## 2025-08-06 PROCEDURE — G8427 DOCREV CUR MEDS BY ELIG CLIN: HCPCS | Performed by: INTERNAL MEDICINE
